# Patient Record
Sex: MALE | Race: WHITE | Employment: OTHER | URBAN - METROPOLITAN AREA
[De-identification: names, ages, dates, MRNs, and addresses within clinical notes are randomized per-mention and may not be internally consistent; named-entity substitution may affect disease eponyms.]

---

## 2024-04-10 ENCOUNTER — HOSPITAL ENCOUNTER (INPATIENT)
Age: 29
LOS: 4 days | Discharge: HOME | DRG: 750 | End: 2024-04-15
Attending: STUDENT IN AN ORGANIZED HEALTH CARE EDUCATION/TRAINING PROGRAM | Admitting: PSYCHIATRY & NEUROLOGY
Payer: COMMERCIAL

## 2024-04-10 DIAGNOSIS — F25.9 SCHIZOAFFECTIVE DISORDER, UNSPECIFIED TYPE (HCC): Primary | ICD-10-CM

## 2024-04-10 LAB
ALBUMIN SERPL-MCNC: 4.4 G/DL (ref 3.5–4.6)
ALP SERPL-CCNC: 55 U/L (ref 35–104)
ALT SERPL-CCNC: 11 U/L (ref 0–41)
AMPHET UR QL SCN: ABNORMAL
ANION GAP SERPL CALCULATED.3IONS-SCNC: 10 MEQ/L (ref 9–15)
APAP SERPL-MCNC: <5 UG/ML (ref 10–30)
AST SERPL-CCNC: 19 U/L (ref 0–40)
BARBITURATES UR QL SCN: ABNORMAL
BASOPHILS # BLD: 0 K/UL (ref 0–0.2)
BASOPHILS NFR BLD: 0.5 %
BENZODIAZ UR QL SCN: ABNORMAL
BILIRUB SERPL-MCNC: 0.6 MG/DL (ref 0.2–0.7)
BILIRUB UR QL STRIP: NEGATIVE
BUN SERPL-MCNC: 10 MG/DL (ref 6–20)
CALCIUM SERPL-MCNC: 9.6 MG/DL (ref 8.5–9.9)
CANNABINOIDS UR QL SCN: POSITIVE
CHLORIDE SERPL-SCNC: 103 MEQ/L (ref 95–107)
CHOLEST SERPL-MCNC: 150 MG/DL (ref 0–199)
CK SERPL-CCNC: 175 U/L (ref 0–190)
CLARITY UR: CLEAR
CO2 SERPL-SCNC: 26 MEQ/L (ref 20–31)
COCAINE UR QL SCN: ABNORMAL
COLOR UR: YELLOW
CREAT SERPL-MCNC: 0.77 MG/DL (ref 0.7–1.2)
DRUG SCREEN COMMENT UR-IMP: ABNORMAL
EOSINOPHIL # BLD: 0.1 K/UL (ref 0–0.7)
EOSINOPHIL NFR BLD: 1.6 %
ERYTHROCYTE [DISTWIDTH] IN BLOOD BY AUTOMATED COUNT: 13 % (ref 11.5–14.5)
ETHANOL PERCENT: NORMAL G/DL
ETHANOLAMINE SERPL-MCNC: <10 MG/DL (ref 0–0.08)
FENTANYL SCREEN, URINE: ABNORMAL
GLOBULIN SER CALC-MCNC: 3 G/DL (ref 2.3–3.5)
GLUCOSE SERPL-MCNC: 115 MG/DL (ref 70–99)
GLUCOSE UR STRIP-MCNC: NEGATIVE MG/DL
HCT VFR BLD AUTO: 40.6 % (ref 42–52)
HDLC SERPL-MCNC: 53 MG/DL (ref 40–59)
HGB BLD-MCNC: 13.8 G/DL (ref 14–18)
HGB UR QL STRIP: NEGATIVE
KETONES UR STRIP-MCNC: NEGATIVE MG/DL
LDLC SERPL CALC-MCNC: 81 MG/DL (ref 0–129)
LEUKOCYTE ESTERASE UR QL STRIP: NEGATIVE
LYMPHOCYTES # BLD: 1.7 K/UL (ref 1–4.8)
LYMPHOCYTES NFR BLD: 29 %
MCH RBC QN AUTO: 31.7 PG (ref 27–31.3)
MCHC RBC AUTO-ENTMCNC: 34 % (ref 33–37)
MCV RBC AUTO: 93.1 FL (ref 79–92.2)
METHADONE UR QL SCN: ABNORMAL
MONOCYTES # BLD: 0.5 K/UL (ref 0.2–0.8)
MONOCYTES NFR BLD: 9 %
NEUTROPHILS # BLD: 3.4 K/UL (ref 1.4–6.5)
NEUTS SEG NFR BLD: 59.7 %
NITRITE UR QL STRIP: NEGATIVE
OPIATES UR QL SCN: ABNORMAL
OXYCODONE UR QL SCN: ABNORMAL
PCP UR QL SCN: ABNORMAL
PH UR STRIP: 7 [PH] (ref 5–9)
PLATELET # BLD AUTO: 357 K/UL (ref 130–400)
POTASSIUM SERPL-SCNC: 3.5 MEQ/L (ref 3.4–4.9)
PROPOXYPH UR QL SCN: ABNORMAL
PROT SERPL-MCNC: 7.4 G/DL (ref 6.3–8)
PROT UR STRIP-MCNC: NEGATIVE MG/DL
RBC # BLD AUTO: 4.36 M/UL (ref 4.7–6.1)
SALICYLATES SERPL-MCNC: <0.3 MG/DL (ref 15–30)
SODIUM SERPL-SCNC: 139 MEQ/L (ref 135–144)
SP GR UR STRIP: 1.02 (ref 1–1.03)
TRIGL SERPL-MCNC: 80 MG/DL (ref 0–150)
TSH SERPL-MCNC: 0.55 UIU/ML (ref 0.44–3.86)
URINE REFLEX TO CULTURE: NORMAL
UROBILINOGEN UR STRIP-ACNC: 0.2 E.U./DL
WBC # BLD AUTO: 5.7 K/UL (ref 4.8–10.8)

## 2024-04-10 PROCEDURE — 82077 ASSAY SPEC XCP UR&BREATH IA: CPT

## 2024-04-10 PROCEDURE — 80179 DRUG ASSAY SALICYLATE: CPT

## 2024-04-10 PROCEDURE — 80053 COMPREHEN METABOLIC PANEL: CPT

## 2024-04-10 PROCEDURE — 80307 DRUG TEST PRSMV CHEM ANLYZR: CPT

## 2024-04-10 PROCEDURE — 81003 URINALYSIS AUTO W/O SCOPE: CPT

## 2024-04-10 PROCEDURE — 83036 HEMOGLOBIN GLYCOSYLATED A1C: CPT

## 2024-04-10 PROCEDURE — 80143 DRUG ASSAY ACETAMINOPHEN: CPT

## 2024-04-10 PROCEDURE — 36415 COLL VENOUS BLD VENIPUNCTURE: CPT

## 2024-04-10 PROCEDURE — 82550 ASSAY OF CK (CPK): CPT

## 2024-04-10 PROCEDURE — 85025 COMPLETE CBC W/AUTO DIFF WBC: CPT

## 2024-04-10 PROCEDURE — 80061 LIPID PANEL: CPT

## 2024-04-10 PROCEDURE — 84443 ASSAY THYROID STIM HORMONE: CPT

## 2024-04-10 PROCEDURE — 99285 EMERGENCY DEPT VISIT HI MDM: CPT

## 2024-04-10 ASSESSMENT — LIFESTYLE VARIABLES
HOW OFTEN DO YOU HAVE A DRINK CONTAINING ALCOHOL: NEVER
HOW MANY STANDARD DRINKS CONTAINING ALCOHOL DO YOU HAVE ON A TYPICAL DAY: PATIENT DOES NOT DRINK

## 2024-04-10 ASSESSMENT — PAIN - FUNCTIONAL ASSESSMENT: PAIN_FUNCTIONAL_ASSESSMENT: NONE - DENIES PAIN

## 2024-04-10 NOTE — ED NOTES
Dr Nazario was here to see the pt for medical assessment for the pt, registration will be able to register pt currently

## 2024-04-10 NOTE — ED NOTES
Pt report was given to ALAN Buck and evening staff regarding he needs assessments and BAC summary completed and on call psychiatrist for the pt's disposition

## 2024-04-10 NOTE — ED PROVIDER NOTES
mEq/L    Anion Gap 10 9 - 15 mEq/L    Glucose 115 (H) 70 - 99 mg/dL    BUN 10 6 - 20 mg/dL    Creatinine 0.77 0.70 - 1.20 mg/dL    Est Glom Filt Rate >90.0 >60    Calcium 9.6 8.5 - 9.9 mg/dL    Total Protein 7.4 6.3 - 8.0 g/dL    Albumin 4.4 3.5 - 4.6 g/dL    Total Bilirubin 0.6 0.2 - 0.7 mg/dL    Alkaline Phosphatase 55 35 - 104 U/L    ALT 11 0 - 41 U/L    AST 19 0 - 40 U/L    Globulin 3.0 2.3 - 3.5 g/dL   Ethanol   Result Value Ref Range    Ethanol Lvl <10 mg/dL    Ethanol percent Not indicated G/dL   Lipid Panel   Result Value Ref Range    Cholesterol, Total 150 0 - 199 mg/dL    Triglycerides 80 0 - 150 mg/dL    HDL 53 40 - 59 mg/dL    LDL Calculated 81 0 - 129 mg/dL   Salicylate   Result Value Ref Range    Salicylate, Serum <0.3 (L) 15.0 - 30.0 mg/dL   TSH   Result Value Ref Range    TSH 0.546 0.440 - 3.860 uIU/mL   Urinalysis with Reflex to Culture    Specimen: Urine   Result Value Ref Range    Color, UA Yellow Straw/Yellow    Clarity, UA Clear Clear    Glucose, Ur Negative Negative mg/dL    Bilirubin Urine Negative Negative    Ketones, Urine Negative Negative mg/dL    Specific Gravity, UA 1.023 1.005 - 1.030    Blood, Urine Negative Negative    pH, UA 7.0 5.0 - 9.0    Protein, UA Negative Negative mg/dL    Urobilinogen, Urine 0.2 <2.0 E.U./dL    Nitrite, Urine Negative Negative    Leukocyte Esterase, Urine Negative Negative    Urine Reflex to Culture Not Indicated    Urine Drug Screen   Result Value Ref Range    Amphetamine Screen, Urine Neg Negative <1000 ng/mL    Barbiturate Screen, Ur Neg Negative < 200 ng/mL    Benzodiazepine Screen, Urine Neg Negative < 200 ng/mL    Cannabinoid Scrn, Ur POSITIVE (A) Negative < 50 ng/mL    Cocaine Metabolite Screen, Urine Neg Negative < 300 ng/mL    Opiate Scrn, Ur Neg Negative < 300 ng/mL    PCP Screen, Urine Neg Negative < 25 ng/mL    Methadone Screen, Urine Neg Negative <300 ng/mL    Propoxyphene Scrn, Ur Neg Negative <300 ng/mL    Oxycodone Urine Neg Negative <100

## 2024-04-10 NOTE — ED NOTES
Pt ate fair for dinner drinking fluids quiet and cooperative needs his assessment completed with him

## 2024-04-10 NOTE — ED NOTES
Pt changed into  clothes, belongings checked and placed into locker 6, urinae sample obtained and sent to lab, lab called for blood work.

## 2024-04-11 LAB
ESTIMATED AVERAGE GLUCOSE: 100 MG/DL
HBA1C MFR BLD: 5.1 % (ref 4–6)

## 2024-04-11 PROCEDURE — 6370000000 HC RX 637 (ALT 250 FOR IP): Performed by: PSYCHIATRY & NEUROLOGY

## 2024-04-11 PROCEDURE — 99223 1ST HOSP IP/OBS HIGH 75: CPT | Performed by: PSYCHIATRY & NEUROLOGY

## 2024-04-11 PROCEDURE — 1240000000 HC EMOTIONAL WELLNESS R&B

## 2024-04-11 RX ORDER — MAGNESIUM HYDROXIDE/ALUMINUM HYDROXICE/SIMETHICONE 120; 1200; 1200 MG/30ML; MG/30ML; MG/30ML
30 SUSPENSION ORAL PRN
Status: DISCONTINUED | OUTPATIENT
Start: 2024-04-11 | End: 2024-04-15 | Stop reason: HOSPADM

## 2024-04-11 RX ORDER — HYDROXYZINE HYDROCHLORIDE 50 MG/ML
50 INJECTION, SOLUTION INTRAMUSCULAR EVERY 6 HOURS PRN
Status: DISCONTINUED | OUTPATIENT
Start: 2024-04-11 | End: 2024-04-15 | Stop reason: HOSPADM

## 2024-04-11 RX ORDER — DIVALPROEX SODIUM 250 MG/1
250 TABLET, DELAYED RELEASE ORAL 3 TIMES DAILY
Status: DISCONTINUED | OUTPATIENT
Start: 2024-04-11 | End: 2024-04-15 | Stop reason: HOSPADM

## 2024-04-11 RX ORDER — HALOPERIDOL 5 MG/1
5 TABLET ORAL EVERY 6 HOURS PRN
Status: DISCONTINUED | OUTPATIENT
Start: 2024-04-11 | End: 2024-04-15 | Stop reason: HOSPADM

## 2024-04-11 RX ORDER — HYDROXYZINE PAMOATE 50 MG/1
50 CAPSULE ORAL EVERY 6 HOURS PRN
Status: DISCONTINUED | OUTPATIENT
Start: 2024-04-11 | End: 2024-04-15 | Stop reason: HOSPADM

## 2024-04-11 RX ORDER — BENZTROPINE MESYLATE 1 MG/ML
2 INJECTION INTRAMUSCULAR; INTRAVENOUS 2 TIMES DAILY PRN
Status: DISCONTINUED | OUTPATIENT
Start: 2024-04-11 | End: 2024-04-15 | Stop reason: HOSPADM

## 2024-04-11 RX ORDER — ACETAMINOPHEN 325 MG/1
650 TABLET ORAL EVERY 4 HOURS PRN
Status: DISCONTINUED | OUTPATIENT
Start: 2024-04-11 | End: 2024-04-15 | Stop reason: HOSPADM

## 2024-04-11 RX ORDER — ARIPIPRAZOLE 5 MG/1
5 TABLET ORAL DAILY
Status: DISCONTINUED | OUTPATIENT
Start: 2024-04-11 | End: 2024-04-14

## 2024-04-11 RX ORDER — HALOPERIDOL 5 MG/ML
5 INJECTION INTRAMUSCULAR EVERY 6 HOURS PRN
Status: DISCONTINUED | OUTPATIENT
Start: 2024-04-11 | End: 2024-04-15 | Stop reason: HOSPADM

## 2024-04-11 RX ORDER — TRAZODONE HYDROCHLORIDE 50 MG/1
50 TABLET ORAL NIGHTLY PRN
Status: DISCONTINUED | OUTPATIENT
Start: 2024-04-11 | End: 2024-04-15 | Stop reason: HOSPADM

## 2024-04-11 RX ADMIN — ARIPIPRAZOLE 5 MG: 5 TABLET ORAL at 10:10

## 2024-04-11 RX ADMIN — DIVALPROEX SODIUM 250 MG: 250 TABLET, DELAYED RELEASE ORAL at 14:58

## 2024-04-11 RX ADMIN — DIVALPROEX SODIUM 250 MG: 250 TABLET, DELAYED RELEASE ORAL at 10:10

## 2024-04-11 RX ADMIN — DIVALPROEX SODIUM 250 MG: 250 TABLET, DELAYED RELEASE ORAL at 20:58

## 2024-04-11 ASSESSMENT — PATIENT HEALTH QUESTIONNAIRE - PHQ9
7. TROUBLE CONCENTRATING ON THINGS, SUCH AS READING THE NEWSPAPER OR WATCHING TELEVISION: NEARLY EVERY DAY
9. THOUGHTS THAT YOU WOULD BE BETTER OFF DEAD, OR OF HURTING YOURSELF: MORE THAN HALF THE DAYS
SUM OF ALL RESPONSES TO PHQ QUESTIONS 1-9: 21
SUM OF ALL RESPONSES TO PHQ QUESTIONS 1-9: 23
1. LITTLE INTEREST OR PLEASURE IN DOING THINGS: NEARLY EVERY DAY
5. POOR APPETITE OR OVEREATING: NEARLY EVERY DAY
6. FEELING BAD ABOUT YOURSELF - OR THAT YOU ARE A FAILURE OR HAVE LET YOURSELF OR YOUR FAMILY DOWN: NEARLY EVERY DAY
3. TROUBLE FALLING OR STAYING ASLEEP: NEARLY EVERY DAY
2. FEELING DOWN, DEPRESSED OR HOPELESS: NEARLY EVERY DAY
10. IF YOU CHECKED OFF ANY PROBLEMS, HOW DIFFICULT HAVE THESE PROBLEMS MADE IT FOR YOU TO DO YOUR WORK, TAKE CARE OF THINGS AT HOME, OR GET ALONG WITH OTHER PEOPLE: VERY DIFFICULT
SUM OF ALL RESPONSES TO PHQ9 QUESTIONS 1 & 2: 6
4. FEELING TIRED OR HAVING LITTLE ENERGY: NOT AT ALL
SUM OF ALL RESPONSES TO PHQ QUESTIONS 1-9: 23
8. MOVING OR SPEAKING SO SLOWLY THAT OTHER PEOPLE COULD HAVE NOTICED. OR THE OPPOSITE, BEING SO FIGETY OR RESTLESS THAT YOU HAVE BEEN MOVING AROUND A LOT MORE THAN USUAL: NEARLY EVERY DAY
SUM OF ALL RESPONSES TO PHQ QUESTIONS 1-9: 23

## 2024-04-11 ASSESSMENT — SLEEP AND FATIGUE QUESTIONNAIRES
AVERAGE NUMBER OF SLEEP HOURS: 3
DO YOU HAVE DIFFICULTY SLEEPING: YES
DO YOU USE A SLEEP AID: NO
SLEEP PATTERN: RESTLESSNESS;DISTURBED/INTERRUPTED SLEEP;INSOMNIA

## 2024-04-11 ASSESSMENT — LIFESTYLE VARIABLES
HOW MANY STANDARD DRINKS CONTAINING ALCOHOL DO YOU HAVE ON A TYPICAL DAY: 10 OR MORE
HOW OFTEN DO YOU HAVE A DRINK CONTAINING ALCOHOL: MONTHLY OR LESS

## 2024-04-11 NOTE — ED NOTES
Patient resting quietly in bed. Respirations are even and unlabored. No distress noted at this time.

## 2024-04-11 NOTE — GROUP NOTE
Date: 4/11/2024    Group Start Time: 1345  Group End Time: 1450  Group Topic: Music Therapy    [facility] [place] [facility] [place]    Emotion Identification Through Classical Music Listening  Patients will learn and discuss a wheel of emotions/differences between emotion words. Patients will then listen to a series of classical instrumental pieces and identify what emotion the composer is trying to represent. Patients will be provided with an explanation of different musical qualities (tempo, pitch, rhythm, dynamics) that may aid in describing why they chose a specific emotion. Patients will discuss the similarities between interpreting classical music, and interpreting nonverbal cues/communication.     Pieces used:  \"Radha de Lune\" by Claude Debussy  \"The Entertainer\" by Jose Raul Batista  \"Symphony No.3 in F Major, Op. 90, III. Poco allegretto\" by Samina Rosa  \"Flight of the Bumblebee\" by Chloe  \"Night on Bald Mountain\" by Dmitry Chavez  \"The Lizton\" by Camille Saint-Saens  \"March Slav\" by Christy Villa  \"The Planets: I. Mars, The Bringer of War\" by John Sellers  \"Symphony No. 94 in G, 'Surprise': II. Andante\" by Tiburcio Agudelo    Focus: Coping Skills, Mindfulness Techniques, Interpersonal Communication Skills, and Emotion Identification    Goals: Improve Mood, Improve Insight/Self-Awareness, Increase Socialization/Community Building, Increase Self-Expression, Improve Attention to Task, Improve Coping Skills/Develop Coping Skills, Improve Emotion Identification/Regulation Skills    Attendance: Did not attend  Modes of Intervention: Music and Coping Skills / Psychoeducation and Receptive Music Listening    Discipline Responsible: Music Therapist  Signature: Ana Diaz, MT-BC, PsychEd Spec

## 2024-04-11 NOTE — ED NOTES
Patient resting quietly with eyes closed. Respirations are even and unlabored. No distress noted at this time.

## 2024-04-11 NOTE — CONSULTS
HISTORY AND PHYSICAL             Date: 4/11/2024        Patient Name: Joaquin Dugan     YOB: 1995      Age:  29 y.o.    Chief Complaint     Chief Complaint   Patient presents with    Psychiatric Evaluation     Selby slipped by [facility] SI/Hi          History Obtained From   patient, electronic medical record    History of Present Illness   Joaquin Dugan is a 29-year-old male currently pink slipped from [facility] for suicidal and homicidal ideation.  Per EMR patient went to the [facility] requesting to be restarted on his Latuda.  Patient voices this helped him in the past he reported not being on any medications in the past 2 years he endorses using marijuana and mushrooms for the past 2 weeks.  Patient reports mild headache 5 out of 10, describes headache as throbbing.  Denies changes in vision or dizziness.  Patient denies chest pain, palpitations, lightheadedness, headache, dizziness, shortness of breath, cough, N/V/D, and changes in appetite.  Patient also denies smoking, illicit drug, and alcohol use.  Denies self-inflicted injuries or wounds.  Patient has a history of schizoaffective disorder, bipolar type.  Hospitalist consulted for evaluation recommendation of acute and chronic disease.    Past Medical History     Past Medical History:   Diagnosis Date    Schizoaffective disorder, bipolar type (HCC)         Past Surgical History   History reviewed. No pertinent surgical history.     Medications Prior to Admission     Prior to Admission medications    Not on File        Allergies   Patient has no known allergies.    Social History     Social History       Tobacco History       Smoking Status  Former Quit Date  3/13/2024 Smoking Tobacco Type  Cigarettes quit in 3/13/2024   Pack Year History     Packs/Day From To Years    0 3/13/2024  0.1      Smokeless Tobacco Use  Never              Alcohol History       Alcohol Use Status  Not Currently              Drug Use       Drug Use Status  Not Currently
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
## 2024-04-11 NOTE — GROUP NOTE
Group Therapy Note    Date: 4/11/2024    Group Start Time: 1200  Group End Time: 1250  Group Topic: Activity    [facility] David Zavala        Group Therapy Note    Attendees: 5         Notes:  Pt did not attend today's self care group.      Modes of Intervention: Activity      Discipline Responsible: Behavorial Health Tech      Signature:  David Kim
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
## 2024-04-11 NOTE — ED NOTES
Easily engaged in 1:1, calm and cooperative with the assessment. Open to treatment   Requesting and provided a book.

## 2024-04-11 NOTE — GROUP NOTE
Date: 4/11/2024    Group Start Time: 0935  Group End Time: 1055  Group Topic: Music Therapy    [facility] [place] [facility]    Iso-Principle Playlist  Patients will learn about the \"iso-principle\", or the idea of matching music to your mood in order to gradually change it. Patients will identify how they feel currently and select songs to gradually alter their mood. Patients will select one song from their personal playlist to add to the group playlist. Patients will listen to the group playlist and reflect on how/if it affected their mood. Patients will discuss the outcomes of the experience and if they could see themselves using it as a coping skill in the future.    Focus: Coping Skills and Emotional Awareness/Regulation    Goals: Improve Mood, Improve Insight/Self-Awareness, Improve Self-Expression, Improve Attention to Task, Improve Coping Skills/Develop Coping Skills, Improve Emotion Awareness/Regulation    Patient's Goal: no goal  Attendance: Did not attend  Modes of MT Intervention: Allie Analysis and Song Discussion, Music and Coping Skills / Psychoeducation, Playlist Building, and Receptive Music Listening  Modes of Intervention: Exploration, Clarifying, Problem-solving, and Confrontation    Discipline Responsible: Music Therapist  Signature: Ana Diaz, MT-BC, PsychEd Spec

## 2024-04-12 PROCEDURE — 1240000000 HC EMOTIONAL WELLNESS R&B

## 2024-04-12 PROCEDURE — 90833 PSYTX W PT W E/M 30 MIN: CPT | Performed by: PSYCHIATRY & NEUROLOGY

## 2024-04-12 PROCEDURE — 99232 SBSQ HOSP IP/OBS MODERATE 35: CPT | Performed by: PSYCHIATRY & NEUROLOGY

## 2024-04-12 PROCEDURE — 6370000000 HC RX 637 (ALT 250 FOR IP): Performed by: PSYCHIATRY & NEUROLOGY

## 2024-04-12 RX ADMIN — ARIPIPRAZOLE 5 MG: 5 TABLET ORAL at 08:29

## 2024-04-12 RX ADMIN — DIVALPROEX SODIUM 250 MG: 250 TABLET, DELAYED RELEASE ORAL at 08:29

## 2024-04-12 RX ADMIN — DIVALPROEX SODIUM 250 MG: 250 TABLET, DELAYED RELEASE ORAL at 20:43

## 2024-04-12 RX ADMIN — DIVALPROEX SODIUM 250 MG: 250 TABLET, DELAYED RELEASE ORAL at 14:30

## 2024-04-12 NOTE — GROUP NOTE
Date: 4/12/2024    Group Start Time: 0940  Group End Time: 1040  Group Topic: Music Therapy    [facility] [place] [facility] Ana Jaime    Active Music Making, Live Music Listening, and Music Reminiscence  Patients will be given a songbook and offered the opportunity to select (a) song(s) of their choice for live music listening on WedWu. Patients will be encouraged to sing along, move along, and listen to the music.     Focus: Self-Expression and Creativity, Processing, and Self-Esteem    Goals: Improve Mood, Decrease Isolation, Increase Sense of Community/Socialization, Improve Self-Esteem, Increase Self-Expression, Provide Sense of Autonomy, Develop Coping Skills    Attendance: Did not attend  Modes of Intervention: Active Music Making, Live Music Listening, and Music Reminiscence    Discipline Responsible: Music Therapist  Signature: LYNN Fuller, PsychEd Spec

## 2024-04-12 NOTE — GROUP NOTE
Date: 4/12/2024    Group Start Time: 1330  Group End Time: 1410  Group Topic: Music Therapy    [facility] [place] [facility] [address] Jaime    Music and Mindfulness  Patients will be introduced to the concept of mindfulness in relation to music listening. Patients will be given a handout to write down thoughts as they listen about song interpretation, vocal quality, instrumentation, and other musical qualities. Patients will be asked to listen to an original version of a song, and then compare it to 1-2 covers of the same song. Patients will discuss the benefits of staying of mindful, challenges of staying mindful, and how mindfulness may apply to their lives.    Focus: Coping Skills, Mindfulness Techniques, and Anxiety Management through music    Goals: Increase Attention to Task, Develop/Maintain Coping Skills, Increase Socialization/Building Community, Improve Mood, Increase/Maintain Expressive Communication, Improve/Maintain Self-Expression, Decrease Impulsive Behaviors, Improve Insight/Self-Awareness    Songs used:   \"Stand by Me\" original by Mamadou Martini; covers by Laurie and the James, and Prince Ferrari    Attendance: Did not attend  Modes of Intervention: Music and Coping Skills / Psychoeducation and Receptive Music Listening    Discipline Responsible: Music Therapist  Signature: Ana Diaz, LYNN, PsychEd Spec

## 2024-04-12 NOTE — GROUP NOTE
Group Therapy Note    Date: 4/12/2024    Group Start Time: 1030  Group End Time: 1050  Group Topic: Activity    [facility] David Zavala        Group Therapy Note    Attendees: 11         Notes:  Pt declined therapy animal service.      Modes of Intervention: Support      Discipline Responsible: Behavorial Health Tech      Signature:  David Kim

## 2024-04-13 PROCEDURE — 1240000000 HC EMOTIONAL WELLNESS R&B

## 2024-04-13 PROCEDURE — 6370000000 HC RX 637 (ALT 250 FOR IP): Performed by: PSYCHIATRY & NEUROLOGY

## 2024-04-13 PROCEDURE — 93005 ELECTROCARDIOGRAM TRACING: CPT | Performed by: PSYCHIATRY & NEUROLOGY

## 2024-04-13 RX ADMIN — DIVALPROEX SODIUM 250 MG: 250 TABLET, DELAYED RELEASE ORAL at 14:31

## 2024-04-13 RX ADMIN — DIVALPROEX SODIUM 250 MG: 250 TABLET, DELAYED RELEASE ORAL at 21:21

## 2024-04-13 RX ADMIN — DIVALPROEX SODIUM 250 MG: 250 TABLET, DELAYED RELEASE ORAL at 09:00

## 2024-04-13 RX ADMIN — ARIPIPRAZOLE 5 MG: 5 TABLET ORAL at 09:01

## 2024-04-14 PROCEDURE — 6370000000 HC RX 637 (ALT 250 FOR IP): Performed by: PSYCHIATRY & NEUROLOGY

## 2024-04-14 PROCEDURE — 1240000000 HC EMOTIONAL WELLNESS R&B

## 2024-04-14 RX ORDER — ARIPIPRAZOLE 10 MG/1
10 TABLET ORAL DAILY
Status: DISCONTINUED | OUTPATIENT
Start: 2024-04-15 | End: 2024-04-15 | Stop reason: HOSPADM

## 2024-04-14 RX ADMIN — DIVALPROEX SODIUM 250 MG: 250 TABLET, DELAYED RELEASE ORAL at 13:50

## 2024-04-14 RX ADMIN — ARIPIPRAZOLE 5 MG: 5 TABLET ORAL at 08:40

## 2024-04-14 RX ADMIN — DIVALPROEX SODIUM 250 MG: 250 TABLET, DELAYED RELEASE ORAL at 08:42

## 2024-04-14 RX ADMIN — DIVALPROEX SODIUM 250 MG: 250 TABLET, DELAYED RELEASE ORAL at 21:30

## 2024-04-14 NOTE — GROUP NOTE
Group Therapy Note    Date: 4/14/2024    Group Start Time: 1000  Group End Time: 1050  Group Topic: Art Therapy     [facility] Emily Walker, ANA        Group Therapy Note    Attendees: 13       Patient's Goal:  To get some more sleep.    Notes:  Patient attended the morning group art therapy session. He selected the chalk pastels for creative expression. Patient used a pastel colored background and then thee images over the background in black. When complimented on his art task, patient was reminded of an art scholarship he once had but never utilized. He verbalized ambivalence over the loss of the scholarship.     Status After Intervention:  Improved    Participation Level: Active Listener    Participation Quality: Appropriate and Attentive      Speech:  normal      Thought Process/Content: Logical      Affective Functioning: Congruent      Mood:  Calm      Level of consciousness:  Alert      Response to Learning: Able to verbalize current knowledge/experience      Endings: None Reported    Modes of Intervention: Activity      Discipline Responsible: Psychoeducational Specialist      Signature:  Emily LITTLE

## 2024-04-14 NOTE — PLAN OF CARE
Problem: Self Harm/Suicidality  Goal: Will have no self-injury during hospital stay  Description: INTERVENTIONS:  1.  Ensure constant observer at bedside with Q15M safety checks  2.  Maintain a safe environment  3.  Secure patient belongings  4.  Ensure family/visitors adhere to safety recommendations  5.  Ensure safety tray has been added to patient's diet order  6.  Every shift and PRN: Re-assess suicidal risk via Frequent Screener    4/12/2024 2334 by Maria R Moyer RN  Outcome: Progressing  4/12/2024 1719 by Shant Platt RN  Outcome: Progressing  Flowsheets (Taken 4/12/2024 1133)  Will have no self-injury during hospital stay: Maintain a safe environment     Problem: Risk for Elopement  Goal: Patient will not exit the unit/facility without proper excort  4/12/2024 2334 by Maria R Moyer RN  Outcome: Progressing  4/12/2024 1719 by Shant Platt RN  Outcome: Progressing  Flowsheets (Taken 4/12/2024 1133)  Nursing Interventions for Elopement Risk:   Assist with personal care needs such as toileting, eating, dressing, as needed to reduce the risk of wandering   Escort with two staff members if patient must leave the unit   Make sure patient has all necessary personal care items   Reduce environmental triggers     Problem: Psychosis  Goal: Will report no hallucinations or delusions  Description: INTERVENTIONS:  1. Administer medication as  ordered  2. Assist with reality testing to support increasing orientation  3. Assess if patient's hallucinations or delusions are encouraging self harm or harm to others and intervene as appropriate  4/12/2024 2334 by Maria R Moyer RN  Outcome: Progressing  4/12/2024 1719 by Shant Platt RN  Outcome: Progressing     Problem: Anxiety  Goal: Will report anxiety at manageable levels  Description: INTERVENTIONS:  1. Administer medication as ordered  2. Teach and rehearse alternative coping skills  3. Provide emotional support with 1:1 interaction with staff  4/12/2024 2334 by João 
  Problem: Self Harm/Suicidality  Goal: Will have no self-injury during hospital stay  Description: INTERVENTIONS:  1.  Ensure constant observer at bedside with Q15M safety checks  2.  Maintain a safe environment  3.  Secure patient belongings  4.  Ensure family/visitors adhere to safety recommendations  5.  Ensure safety tray has been added to patient's diet order  6.  Every shift and PRN: Re-assess suicidal risk via Frequent Screener    4/13/2024 0738 by Shant Platt RN  Outcome: Progressing  4/12/2024 2334 by Maria R Moyer RN  Outcome: Progressing     Problem: Risk for Elopement  Goal: Patient will not exit the unit/facility without proper excort  4/13/2024 0738 by Shant Platt RN  Outcome: Progressing  4/12/2024 2334 by Maria R Moyer RN  Outcome: Progressing     Problem: Psychosis  Goal: Will report no hallucinations or delusions  Description: INTERVENTIONS:  1. Administer medication as  ordered  2. Assist with reality testing to support increasing orientation  3. Assess if patient's hallucinations or delusions are encouraging self harm or harm to others and intervene as appropriate  4/13/2024 0738 by Shant Platt RN  Outcome: Progressing  4/12/2024 2334 by Maria R Moyer RN  Outcome: Progressing     Problem: Anxiety  Goal: Will report anxiety at manageable levels  Description: INTERVENTIONS:  1. Administer medication as ordered  2. Teach and rehearse alternative coping skills  3. Provide emotional support with 1:1 interaction with staff  4/13/2024 0738 by Shant Platt RN  Outcome: Progressing  4/12/2024 2334 by Maria R Moyer RN  Outcome: Progressing     Problem: Sleep Disturbance  Goal: Will exhibit normal sleeping pattern  Description: INTERVENTIONS:  1. Administer medication as ordered  2. Decrease environmental stimuli, including noise, as appropriate  3. Discourage social isolation and naps during the day  4/13/2024 0738 by Shant Platt RN  Outcome: Progressing  4/12/2024 2334 by Maria R Moyer 
  Problem: Self Harm/Suicidality  Goal: Will have no self-injury during hospital stay  Description: INTERVENTIONS:  1.  Ensure constant observer at bedside with Q15M safety checks  2.  Maintain a safe environment  3.  Secure patient belongings  4.  Ensure family/visitors adhere to safety recommendations  5.  Ensure safety tray has been added to patient's diet order  6.  Every shift and PRN: Re-assess suicidal risk via Frequent Screener    Outcome: Progressing  Flowsheets (Taken 4/12/2024 1133)  Will have no self-injury during hospital stay: Maintain a safe environment     Problem: Risk for Elopement  Goal: Patient will not exit the unit/facility without proper excort  Outcome: Progressing  Flowsheets (Taken 4/12/2024 1133)  Nursing Interventions for Elopement Risk:   Assist with personal care needs such as toileting, eating, dressing, as needed to reduce the risk of wandering   Escort with two staff members if patient must leave the unit   Make sure patient has all necessary personal care items   Reduce environmental triggers     Problem: Psychosis  Goal: Will report no hallucinations or delusions  Description: INTERVENTIONS:  1. Administer medication as  ordered  2. Assist with reality testing to support increasing orientation  3. Assess if patient's hallucinations or delusions are encouraging self harm or harm to others and intervene as appropriate  Outcome: Progressing     Problem: Anxiety  Goal: Will report anxiety at manageable levels  Description: INTERVENTIONS:  1. Administer medication as ordered  2. Teach and rehearse alternative coping skills  3. Provide emotional support with 1:1 interaction with staff  Outcome: Progressing  Flowsheets (Taken 4/12/2024 1133)  Will report anxiety at manageable levels: Administer medication as ordered     Problem: Sleep Disturbance  Goal: Will exhibit normal sleeping pattern  Description: INTERVENTIONS:  1. Administer medication as ordered  2. Decrease environmental 
Patient interviewed and assessed in his room, in bed where he isolated to for the entire shift.  Patient rates anxiety 0 and depression 0 on a 10 point scale where 10 is the worst.  Patient denies SI, HI, and hallucinations currently but does admit to having visual and auditory hallucinations earlier in the day. He seems to be minimizing symptoms for this nurse as this nurse had to really inquire hard about circumstances of admission before the patient admitted to any symptoms.  He states he is here for SI and HI along with the a/v hallucinations he was having earlier (would not elaborate on what the hallucinations were).  Patient does not want to hurt anyone personally, he just has thoughts at times that he should.  Patient is very friendly with this nurse and cooperative.  He is not social with peers. Patient would like to get back on his feet with a job and a place to live.  He understands he needs to deal with his mental health issues first.  He is able to make needs known. Call light remains in reach. He was checked on by this nurse several times throughout the day (in addition to q15 minute checks) because he isolated to his room all day.  He was noted to sleep most of the shift.  Patient denies needs at this time.  Will continue to monitor and address needs as they arise.  Call light is within reach.       Problem: Self Harm/Suicidality  Goal: Will have no self-injury during hospital stay  Description: INTERVENTIONS:  1.  Ensure constant observer at bedside with Q15M safety checks  2.  Maintain a safe environment  3.  Secure patient belongings  4.  Ensure family/visitors adhere to safety recommendations  5.  Ensure safety tray has been added to patient's diet order  6.  Every shift and PRN: Re-assess suicidal risk via Frequent Screener    4/11/2024 2232 by Nisa Hirsch, RN  Outcome: Progressing  Flowsheets (Taken 4/11/2024 2229)  Will have no self-injury during hospital stay: Maintain a safe 
assistive devices as needed  3. Obtain PT/OT consults as needed  4. Assist and instruct patient to increase activity and self care as tolerated  4/14/2024 1134 by Ping Jarrett, RN  Outcome: Progressing  4/14/2024 0211 by Cedric Silverman, RN  Outcome: Progressing     Problem: Discharge Planning  Goal: Discharge to home or other facility with appropriate resources  4/14/2024 1134 by Ping Jarrett, RN  Outcome: Progressing  Flowsheets (Taken 4/14/2024 1123)  Discharge to home or other facility with appropriate resources: Identify barriers to discharge with patient and caregiver  4/14/2024 0211 by Cedric Silverman, RN  Outcome: Progressing

## 2024-04-15 VITALS
RESPIRATION RATE: 18 BRPM | HEART RATE: 88 BPM | BODY MASS INDEX: 22.96 KG/M2 | OXYGEN SATURATION: 100 % | TEMPERATURE: 97.7 F | HEIGHT: 69 IN | WEIGHT: 155 LBS | DIASTOLIC BLOOD PRESSURE: 81 MMHG | SYSTOLIC BLOOD PRESSURE: 117 MMHG

## 2024-04-15 LAB
EKG ATRIAL RATE: 63 BPM
EKG P AXIS: 56 DEGREES
EKG P-R INTERVAL: 130 MS
EKG Q-T INTERVAL: 376 MS
EKG QRS DURATION: 84 MS
EKG QTC CALCULATION (BAZETT): 384 MS
EKG R AXIS: 86 DEGREES
EKG T AXIS: 65 DEGREES
EKG VENTRICULAR RATE: 63 BPM
VALPROATE SERPL-MCNC: 59.2 UG/ML (ref 50–100)

## 2024-04-15 PROCEDURE — 6370000000 HC RX 637 (ALT 250 FOR IP): Performed by: PSYCHIATRY & NEUROLOGY

## 2024-04-15 PROCEDURE — 93010 ELECTROCARDIOGRAM REPORT: CPT | Performed by: INTERNAL MEDICINE

## 2024-04-15 PROCEDURE — 36415 COLL VENOUS BLD VENIPUNCTURE: CPT

## 2024-04-15 PROCEDURE — 80164 ASSAY DIPROPYLACETIC ACD TOT: CPT

## 2024-04-15 PROCEDURE — 6370000000 HC RX 637 (ALT 250 FOR IP): Performed by: NURSE PRACTITIONER

## 2024-04-15 PROCEDURE — 99239 HOSP IP/OBS DSCHRG MGMT >30: CPT | Performed by: PSYCHIATRY & NEUROLOGY

## 2024-04-15 RX ORDER — DIVALPROEX SODIUM 250 MG/1
250 TABLET, DELAYED RELEASE ORAL 3 TIMES DAILY
Qty: 45 TABLET | Refills: 3 | Status: SHIPPED | OUTPATIENT
Start: 2024-04-15

## 2024-04-15 RX ORDER — ARIPIPRAZOLE 10 MG/1
10 TABLET ORAL DAILY
Qty: 15 TABLET | Refills: 3 | Status: SHIPPED | OUTPATIENT
Start: 2024-04-16

## 2024-04-15 RX ADMIN — DIVALPROEX SODIUM 250 MG: 250 TABLET, DELAYED RELEASE ORAL at 08:50

## 2024-04-15 RX ADMIN — ARIPIPRAZOLE 10 MG: 10 TABLET ORAL at 08:50

## 2024-04-15 NOTE — GROUP NOTE
Date: 4/15/2024    Group Start Time: 0940  Group End Time: 1050  Group Topic: Music Therapy    ML 3W Ana Jaime    Album of Me  Patients will listen to \"100 Years\" by [employer] and discuss lyrics and song interpretations as a group. Patients will discuss how their use of music may/may not have changed throughout their lives. Patients will create an \"Album of Me\" where they identify songs from childhood, teenage years, adulthood, where they are currently, and their favorite song right now. Patients will be invited to select a song and be encouraged to explain their connection to it. Patients create a group playlist and contribute one song each. Patients will listen to the playlist and reflect on their experiences afterwards.     Focus: Coping Skills, Validation/Support, Self-Esteem, Self-Expression, & Insight Development    Goals: Improve Mood, Improve Insight/Self-Awareness, Increase Socialization/Community Building, Increase Self-Expression, Improve Attention to Task, Improve Coping Skills/Develop Coping Skills    Patient's Goal: no goal  Attendance: Did not attend/Anticipated Discharge today  Modes of Intervention: Allie Analysis and Song Discussion, Music Reminiscence, Playlist Building, and Receptive Music Listening    Discipline Responsible: Music Therapist  Signature: Ana Diaz, MT-BC, PsychEd Spec

## 2024-04-15 NOTE — DISCHARGE SUMMARY
05:15 PM    PHENCYCLIDINESCREENURINE Neg 04/10/2024 05:15 PM    ETOH <10 04/10/2024 06:04 PM     Lab Results   Component Value Date/Time    TSH 0.546 04/10/2024 06:04 PM     No results found for: \"LITHIUM\"  Lab Results   Component Value Date    VALPROATE 59.2 04/15/2024       RISK ASSESSMENT AT DISCHARGE: Low risk for suicide and homicide.     Treatment Plan:  Reviewed current Medications with the patient. Education provided on the complaince with treatment.    Risks, benefits, side effects, drug-to-drug interactions and alternatives to treatment were discussed.    Encourage patient to attend outpatient follow up appointment and therapy.    Patient was advised to call the outpatient provider, visit the nearest ED or call 911 if symptoms are not manageable.     Patient's family member was contacted prior to the discharge.         Medication List        START taking these medications      ARIPiprazole 10 MG tablet  Commonly known as: ABILIFY  Take 1 tablet by mouth daily  Start taking on: April 16, 2024     divalproex 250 MG DR tablet  Commonly known as: DEPAKOTE  Take 1 tablet by mouth in the morning, at noon, and at bedtime               Where to Get Your Medications        These medications were sent to [facility] [place] - [address]  - P [phone] - F [phone]  [facility] [address] [place] [address]      Phone: [phone]   ARIPiprazole 10 MG tablet  divalproex 250 MG DR tablet           Reason for more than one antipsychotic:   [x] N/A  [] 3 failed monotherapy(drugs tried):  [] Cross over to a new antipsychotic  [] Taper to monotherapy from polypharmacy  [] Augmentation of Clozapine therapy due to treatment resistance to single therapy        TIME SPEND - 35 MINUTES TO COMPLETE THE EVALUATION, DISCHARGE SUMMARY, MEDICATION RECONCILIATION AND FOLLOW UP CARE     Signed:  SACHA CALDWELL MD  4/15/2024  9:10 AM
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
## 2024-04-15 NOTE — DISCHARGE INSTRUCTIONS
Due to the Covid-19 Pandemic, [facility] is not currently available. For assistance with quitting smoking please go to https://smokefree.gov. A prescription for an FDA-approved tobacco cessation medication was offered at discharge and the patient refused.    Someone from [facility] will be calling you tomorrow to follow up on your care. If you don't hear from us, give us a call! [phone].  Keep all follow up appointments, take medications as ordered, utilize positive supports, abstain from use of alcohol and drugs. If symptoms return or you feel at risk to yourself or others, please call 911, return the nearest emergency room, or call your local crisis hotline:  [facility]: [phone]  [facility]: [phone]  [facility]: [phone]     You were offered the flu vaccine on 4/15/24 and you declined
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
## 2024-04-15 NOTE — DISCHARGE INSTR - DIET

## 2025-01-24 ENCOUNTER — HOSPITAL ENCOUNTER (INPATIENT)
Age: 30
LOS: 6 days | Discharge: HOME | DRG: 761 | End: 2025-01-30
Attending: PSYCHIATRY & NEUROLOGY | Admitting: PSYCHIATRY & NEUROLOGY
Payer: COMMERCIAL

## 2025-01-24 DIAGNOSIS — F25.1 SCHIZOAFFECTIVE DISORDER, DEPRESSIVE TYPE (HCC): Primary | ICD-10-CM

## 2025-01-24 DIAGNOSIS — F25.9 SCHIZOAFFECTIVE DISORDER, UNSPECIFIED TYPE (HCC): ICD-10-CM

## 2025-01-24 LAB
ALBUMIN SERPL-MCNC: 4.4 G/DL (ref 3.5–4.6)
ALP SERPL-CCNC: 58 U/L (ref 35–104)
ALT SERPL-CCNC: 10 U/L (ref 0–41)
AMPHET UR QL SCN: NORMAL
ANION GAP SERPL CALCULATED.3IONS-SCNC: 12 MEQ/L (ref 9–15)
APAP SERPL-MCNC: <5 UG/ML (ref 10–30)
AST SERPL-CCNC: 14 U/L (ref 0–40)
BACTERIA URNS QL MICRO: NEGATIVE /HPF
BARBITURATES UR QL SCN: NORMAL
BASOPHILS # BLD: 0 K/UL (ref 0–0.2)
BASOPHILS NFR BLD: 0.2 %
BENZODIAZ UR QL SCN: NORMAL
BILIRUB SERPL-MCNC: 0.7 MG/DL (ref 0.2–0.7)
BILIRUB UR QL STRIP: NEGATIVE
BUN SERPL-MCNC: 11 MG/DL (ref 6–20)
CALCIUM SERPL-MCNC: 9.2 MG/DL (ref 8.5–9.9)
CANNABINOIDS UR QL SCN: NORMAL
CHLORIDE SERPL-SCNC: 104 MEQ/L (ref 95–107)
CK SERPL-CCNC: 104 U/L (ref 0–190)
CLARITY UR: CLEAR
CO2 SERPL-SCNC: 25 MEQ/L (ref 20–31)
COCAINE UR QL SCN: NORMAL
COLOR UR: YELLOW
CREAT SERPL-MCNC: 0.72 MG/DL (ref 0.7–1.2)
DRUG SCREEN COMMENT UR-IMP: NORMAL
EOSINOPHIL # BLD: 0.1 K/UL (ref 0–0.7)
EOSINOPHIL NFR BLD: 1.2 %
EPI CELLS #/AREA URNS AUTO: NORMAL /HPF (ref 0–5)
ERYTHROCYTE [DISTWIDTH] IN BLOOD BY AUTOMATED COUNT: 12.2 % (ref 11.5–14.5)
ETHANOL PERCENT: NORMAL G/DL
ETHANOLAMINE SERPL-MCNC: <10 MG/DL (ref 0–0.08)
FENTANYL SCREEN, URINE: NORMAL
GLOBULIN SER CALC-MCNC: 2.7 G/DL (ref 2.3–3.5)
GLUCOSE SERPL-MCNC: 107 MG/DL (ref 70–99)
GLUCOSE UR STRIP-MCNC: NEGATIVE MG/DL
HCT VFR BLD AUTO: 41.2 % (ref 42–52)
HGB BLD-MCNC: 14.1 G/DL (ref 14–18)
HGB UR QL STRIP: NEGATIVE
HYALINE CASTS #/AREA URNS AUTO: NORMAL /HPF (ref 0–5)
KETONES UR STRIP-MCNC: NEGATIVE MG/DL
LEUKOCYTE ESTERASE UR QL STRIP: ABNORMAL
LYMPHOCYTES # BLD: 1.2 K/UL (ref 1–4.8)
LYMPHOCYTES NFR BLD: 24.3 %
MAGNESIUM SERPL-MCNC: 1.9 MG/DL (ref 1.7–2.4)
MCH RBC QN AUTO: 29.9 PG (ref 27–31.3)
MCHC RBC AUTO-ENTMCNC: 34.2 % (ref 33–37)
MCV RBC AUTO: 87.3 FL (ref 79–92.2)
METHADONE UR QL SCN: NORMAL
MONOCYTES # BLD: 0.4 K/UL (ref 0.2–0.8)
MONOCYTES NFR BLD: 8.1 %
NEUTROPHILS # BLD: 3.3 K/UL (ref 1.4–6.5)
NEUTS SEG NFR BLD: 66 %
NITRITE UR QL STRIP: NEGATIVE
OPIATES UR QL SCN: NORMAL
OXYCODONE UR QL SCN: NORMAL
PCP UR QL SCN: NORMAL
PH UR STRIP: 7 [PH] (ref 5–9)
PLATELET # BLD AUTO: 389 K/UL (ref 130–400)
POTASSIUM SERPL-SCNC: 4.4 MEQ/L (ref 3.4–4.9)
PROPOXYPH UR QL SCN: NORMAL
PROT SERPL-MCNC: 7.1 G/DL (ref 6.3–8)
PROT UR STRIP-MCNC: NEGATIVE MG/DL
RBC # BLD AUTO: 4.72 M/UL (ref 4.7–6.1)
RBC #/AREA URNS AUTO: NORMAL /HPF (ref 0–5)
SALICYLATES SERPL-MCNC: <0.3 MG/DL (ref 15–30)
SODIUM SERPL-SCNC: 141 MEQ/L (ref 135–144)
SP GR UR STRIP: 1.01 (ref 1–1.03)
URINE REFLEX TO CULTURE: ABNORMAL
UROBILINOGEN UR STRIP-ACNC: 0.2 E.U./DL
WBC # BLD AUTO: 4.9 K/UL (ref 4.8–10.8)
WBC #/AREA URNS AUTO: NORMAL /HPF (ref 0–5)

## 2025-01-24 PROCEDURE — 81001 URINALYSIS AUTO W/SCOPE: CPT

## 2025-01-24 PROCEDURE — 85025 COMPLETE CBC W/AUTO DIFF WBC: CPT

## 2025-01-24 PROCEDURE — 36415 COLL VENOUS BLD VENIPUNCTURE: CPT

## 2025-01-24 PROCEDURE — 82550 ASSAY OF CK (CPK): CPT

## 2025-01-24 PROCEDURE — 80307 DRUG TEST PRSMV CHEM ANLYZR: CPT

## 2025-01-24 PROCEDURE — 6370000000 HC RX 637 (ALT 250 FOR IP): Performed by: PSYCHIATRY & NEUROLOGY

## 2025-01-24 PROCEDURE — 80053 COMPREHEN METABOLIC PANEL: CPT

## 2025-01-24 PROCEDURE — 99285 EMERGENCY DEPT VISIT HI MDM: CPT

## 2025-01-24 PROCEDURE — 83036 HEMOGLOBIN GLYCOSYLATED A1C: CPT

## 2025-01-24 PROCEDURE — 80143 DRUG ASSAY ACETAMINOPHEN: CPT

## 2025-01-24 PROCEDURE — 82077 ASSAY SPEC XCP UR&BREATH IA: CPT

## 2025-01-24 PROCEDURE — 83735 ASSAY OF MAGNESIUM: CPT

## 2025-01-24 PROCEDURE — 93005 ELECTROCARDIOGRAM TRACING: CPT | Performed by: PHYSICIAN ASSISTANT

## 2025-01-24 PROCEDURE — 1240000000 HC EMOTIONAL WELLNESS R&B

## 2025-01-24 PROCEDURE — 80179 DRUG ASSAY SALICYLATE: CPT

## 2025-01-24 RX ORDER — HYDROXYZINE PAMOATE 50 MG/1
50 CAPSULE ORAL EVERY 6 HOURS PRN
Status: DISCONTINUED | OUTPATIENT
Start: 2025-01-24 | End: 2025-01-30 | Stop reason: HOSPADM

## 2025-01-24 RX ORDER — HALOPERIDOL 5 MG/ML
5 INJECTION INTRAMUSCULAR EVERY 6 HOURS PRN
Status: DISCONTINUED | OUTPATIENT
Start: 2025-01-24 | End: 2025-01-30 | Stop reason: HOSPADM

## 2025-01-24 RX ORDER — BENZTROPINE MESYLATE 1 MG/ML
2 INJECTION, SOLUTION INTRAMUSCULAR; INTRAVENOUS 2 TIMES DAILY PRN
Status: DISCONTINUED | OUTPATIENT
Start: 2025-01-24 | End: 2025-01-30 | Stop reason: HOSPADM

## 2025-01-24 RX ORDER — HYDROXYZINE HYDROCHLORIDE 50 MG/ML
50 INJECTION, SOLUTION INTRAMUSCULAR EVERY 6 HOURS PRN
Status: DISCONTINUED | OUTPATIENT
Start: 2025-01-24 | End: 2025-01-30 | Stop reason: HOSPADM

## 2025-01-24 RX ORDER — ACETAMINOPHEN 325 MG/1
650 TABLET ORAL EVERY 4 HOURS PRN
Status: DISCONTINUED | OUTPATIENT
Start: 2025-01-24 | End: 2025-01-30 | Stop reason: HOSPADM

## 2025-01-24 RX ORDER — TRAZODONE HYDROCHLORIDE 50 MG/1
50 TABLET, FILM COATED ORAL NIGHTLY PRN
Status: DISCONTINUED | OUTPATIENT
Start: 2025-01-24 | End: 2025-01-28

## 2025-01-24 RX ORDER — MAGNESIUM HYDROXIDE/ALUMINUM HYDROXICE/SIMETHICONE 120; 1200; 1200 MG/30ML; MG/30ML; MG/30ML
30 SUSPENSION ORAL PRN
Status: DISCONTINUED | OUTPATIENT
Start: 2025-01-24 | End: 2025-01-30 | Stop reason: HOSPADM

## 2025-01-24 RX ORDER — HALOPERIDOL 5 MG/1
5 TABLET ORAL EVERY 6 HOURS PRN
Status: DISCONTINUED | OUTPATIENT
Start: 2025-01-24 | End: 2025-01-30 | Stop reason: HOSPADM

## 2025-01-24 RX ADMIN — TRAZODONE HYDROCHLORIDE 50 MG: 50 TABLET, FILM COATED ORAL at 23:42

## 2025-01-24 RX ADMIN — HYDROXYZINE PAMOATE 50 MG: 50 CAPSULE ORAL at 23:43

## 2025-01-24 ASSESSMENT — PATIENT HEALTH QUESTIONNAIRE - PHQ9
6. FEELING BAD ABOUT YOURSELF - OR THAT YOU ARE A FAILURE OR HAVE LET YOURSELF OR YOUR FAMILY DOWN: SEVERAL DAYS
2. FEELING DOWN, DEPRESSED OR HOPELESS: NEARLY EVERY DAY
SUM OF ALL RESPONSES TO PHQ QUESTIONS 1-9: 18
9. THOUGHTS THAT YOU WOULD BE BETTER OFF DEAD, OR OF HURTING YOURSELF: MORE THAN HALF THE DAYS
SUM OF ALL RESPONSES TO PHQ QUESTIONS 1-9: 18
1. LITTLE INTEREST OR PLEASURE IN DOING THINGS: NOT AT ALL
8. MOVING OR SPEAKING SO SLOWLY THAT OTHER PEOPLE COULD HAVE NOTICED. OR THE OPPOSITE, BEING SO FIGETY OR RESTLESS THAT YOU HAVE BEEN MOVING AROUND A LOT MORE THAN USUAL: NEARLY EVERY DAY
SUM OF ALL RESPONSES TO PHQ9 QUESTIONS 1 & 2: 3
4. FEELING TIRED OR HAVING LITTLE ENERGY: NOT AT ALL
7. TROUBLE CONCENTRATING ON THINGS, SUCH AS READING THE NEWSPAPER OR WATCHING TELEVISION: NEARLY EVERY DAY
SUM OF ALL RESPONSES TO PHQ QUESTIONS 1-9: 16
5. POOR APPETITE OR OVEREATING: NEARLY EVERY DAY
SUM OF ALL RESPONSES TO PHQ QUESTIONS 1-9: 18
10. IF YOU CHECKED OFF ANY PROBLEMS, HOW DIFFICULT HAVE THESE PROBLEMS MADE IT FOR YOU TO DO YOUR WORK, TAKE CARE OF THINGS AT HOME, OR GET ALONG WITH OTHER PEOPLE: EXTREMELY DIFFICULT
3. TROUBLE FALLING OR STAYING ASLEEP: NEARLY EVERY DAY

## 2025-01-24 ASSESSMENT — PAIN - FUNCTIONAL ASSESSMENT: PAIN_FUNCTIONAL_ASSESSMENT: NONE - DENIES PAIN

## 2025-01-24 ASSESSMENT — LIFESTYLE VARIABLES
HOW MANY STANDARD DRINKS CONTAINING ALCOHOL DO YOU HAVE ON A TYPICAL DAY: PATIENT DOES NOT DRINK
HOW OFTEN DO YOU HAVE A DRINK CONTAINING ALCOHOL: NEVER
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
## 2025-01-24 NOTE — ED PROVIDER NOTES
following components:    Hematocrit 41.2 (*)     All other components within normal limits   URINALYSIS WITH REFLEX TO CULTURE - Abnormal; Notable for the following components:    Leukocyte Esterase, Urine TRACE (*)     All other components within normal limits   CK   ETHANOL   MAGNESIUM   URINE DRUG SCREEN   MICROSCOPIC URINALYSIS   HEMOGLOBIN A1C   LIPID PANEL       All other labs were within normal range or not returned as of this dictation.    EMERGENCY DEPARTMENT COURSE and DIFFERENTIAL DIAGNOSIS/MDM:   Vitals:    Vitals:    01/24/25 1739   BP: (!) 153/85   Pulse: 84   Resp: 17   Temp: 98.7 °F (37.1 °C)   TempSrc: Temporal   SpO2: 99%   Weight: 63.5 kg (140 lb)   Height: 1.753 m (5' 9\")            Medical Decision Making  presents to the emergency department patient presents with reported thoughts of suicide he has not had his medication in 6 to 8 months.  Has been having sleeping issues.  He denies any injuries including cuts or bruises he does note that he does self tattoo.  Denies any fever, chills, nausea, vomiting, urinary bleeding, rectal bleeding, pain with urination, cough, congestion.  Symptoms moderate severity nothing improves symptoms only worsen symptoms.    Differential diagnose include but not limited to medication noncompliance, SI, sleep disturbance.  Will add behavioral health labs including CBC, CMP, TSH, CK, urine drug screen, urinalysis, EtOH, acetaminophen, salicylate.  Reviewed patient's behavioral health labs.  Patient medically stable for behavioral health evaluation.   evaluation Dr. Hendricks recommends admission will admit to 3 W.      Amount and/or Complexity of Data Reviewed  Labs: ordered. Decision-making details documented in ED Course.  ECG/medicine tests: ordered.            REASSESSMENT     ED Course as of 01/25/25 0012 Fri Jan 24, 2025 1854 Magnesium: 1.9 [GR]   1854 Ethanol Lvl: <10 [GR]   1854 Total CK: 104 [GR]   1854 Glucose(!): 107 [GR]   1854 Sodium: 141 [GR]   1854

## 2025-01-24 NOTE — ED TRIAGE NOTES
Pink slipped by last   Pt hasn't been sleeping   Thoughts of suicide (no active plan)  Pt hallucinating (hearing and seeing things that aren't there)

## 2025-01-24 NOTE — ED NOTES
1745: Patient arrived to unit. Changed into psych safe clothing. Belongings locked up in locker 4. Patient had large knife which security was given.

## 2025-01-25 LAB
CHOLEST SERPL-MCNC: 142 MG/DL (ref 0–199)
ESTIMATED AVERAGE GLUCOSE: 103 MG/DL
HBA1C MFR BLD: 5.2 % (ref 4–6)
HDLC SERPL-MCNC: 45 MG/DL (ref 40–59)
LDLC SERPL CALC-MCNC: 77 MG/DL (ref 0–129)
TRIGL SERPL-MCNC: 100 MG/DL (ref 0–150)

## 2025-01-25 PROCEDURE — 6370000000 HC RX 637 (ALT 250 FOR IP): Performed by: NURSE PRACTITIONER

## 2025-01-25 PROCEDURE — 6370000000 HC RX 637 (ALT 250 FOR IP): Performed by: PSYCHIATRY & NEUROLOGY

## 2025-01-25 PROCEDURE — 80061 LIPID PANEL: CPT

## 2025-01-25 PROCEDURE — 1240000000 HC EMOTIONAL WELLNESS R&B

## 2025-01-25 PROCEDURE — 36415 COLL VENOUS BLD VENIPUNCTURE: CPT

## 2025-01-25 RX ORDER — OXCARBAZEPINE 150 MG/1
150 TABLET, FILM COATED ORAL 2 TIMES DAILY
Status: DISCONTINUED | OUTPATIENT
Start: 2025-01-25 | End: 2025-01-28

## 2025-01-25 RX ORDER — ARIPIPRAZOLE 5 MG/1
5 TABLET ORAL DAILY
Status: DISCONTINUED | OUTPATIENT
Start: 2025-01-25 | End: 2025-01-30 | Stop reason: HOSPADM

## 2025-01-25 RX ADMIN — HALOPERIDOL 5 MG: 5 TABLET ORAL at 09:48

## 2025-01-25 RX ADMIN — HYDROXYZINE PAMOATE 50 MG: 50 CAPSULE ORAL at 21:04

## 2025-01-25 RX ADMIN — OXCARBAZEPINE 150 MG: 150 TABLET, FILM COATED ORAL at 21:04

## 2025-01-25 RX ADMIN — TRAZODONE HYDROCHLORIDE 50 MG: 50 TABLET, FILM COATED ORAL at 21:04

## 2025-01-25 RX ADMIN — ARIPIPRAZOLE 5 MG: 5 TABLET ORAL at 11:45

## 2025-01-25 RX ADMIN — OXCARBAZEPINE 150 MG: 150 TABLET, FILM COATED ORAL at 11:45

## 2025-01-25 ASSESSMENT — SLEEP AND FATIGUE QUESTIONNAIRES
SLEEP PATTERN: INSOMNIA
DO YOU USE A SLEEP AID: NO
DO YOU HAVE DIFFICULTY SLEEPING: YES

## 2025-01-25 NOTE — GROUP NOTE
Group Therapy Note  Patient did not attend group.   Date: 1/25/2025    Group Start Time: 1000  Group End Time: 1045  Group Topic: Psychoeducation    [facility] 3[facility] Pham Flores    Group Therapy Note    Healthy Boundaries    Description:  Patients will be directed to complete a worksheet that has prompts about boundaries and how to set them. These prompts include who the boundary needs to be set with, what type of boundary it is, what the fears are associated with setting the boundary, how is not setting the boundary affecting them, how they would feel if they placed the healthy boundary, and a positive affirmation. Topics of what healthy vs unhealthy boundaries consist of, how to set boundaries with people, how to identify boundaries to set, and what the possible outcomes of these boundaries will be mentioned within this intervention. Patients will be assessed on reality orientation, healthy boundary vs unhealthy boundary, positive or negative outlook, and who the boundary is about (insight into issues).     Goals:   Reality orientation, insight into issues, boundary awareness, discussion with peers, positive life outlook      Signature: Pham Simon, Psychoeducational Specialist

## 2025-01-25 NOTE — ED NOTES
Consult with Dr Washington. Patient to be admitted to [facility] with the following orders.
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
## 2025-01-25 NOTE — CONSULTS
Consult      Patient:  Joaquin Dugan  YOB: 1995    MRN: [id number]     Acct: [id number]    Primary Care Physician: No primary care provider on file.    HISTORY OF PRESENT ILLNESS:   History obtained from chart review and the patient.    The patient is a 29 y.o. male whom I have been requested to see by Dr. Hernandez for evaluation of medical treatment. Patient was admitted over night via ER due to suicidal ideation/schizoaffective disorder/depression. Denied dyspnea/CP/dizziness    Past Medical History:        Diagnosis Date    Schizoaffective disorder, bipolar type (HCC)        Past Surgical History:  History reviewed. No pertinent surgical history.    Medications:    No current facility-administered medications on file prior to encounter.     Current Outpatient Medications on File Prior to Encounter   Medication Sig Dispense Refill    divalproex (DEPAKOTE) 250 MG DR tablet Take 1 tablet by mouth in the morning, at noon, and at bedtime (Patient not taking: Reported on 1/24/2025) 45 tablet 3    ARIPiprazole (ABILIFY) 10 MG tablet Take 1 tablet by mouth daily (Patient not taking: Reported on 1/24/2025) 15 tablet 3       Allergies:  Patient has no known allergies.    Social History:    reports that he quit smoking about 10 months ago. His smoking use included cigarettes. He has never used smokeless tobacco. He reports that he does not currently use alcohol. He reports that he does not currently use drugs after having used the following drugs: Marijuana (Weed).    Occupation:     Family History:   History reviewed. No pertinent family history.    Review of systems:  Constitutional: no fever, no night sweats, no fatigue  Head: no headache, no head injury, no migranes.  Eye: no blurring of vision, no double vision.  Ears: no hearing difficulty, no tinnitus  Mouth/throat: no ulceration, dental caries, dysphagia  Lungs: no cough, no shortness of breath, no wheeze  CVS: no palpitation, no chest pain, no

## 2025-01-25 NOTE — PLAN OF CARE
Problem: Risk for Elopement  Goal: Patient will not exit the unit/facility without proper excort  Outcome: Progressing  Flowsheets  Taken 1/25/2025 0801 by Sierra Cervantes, RN  Nursing Interventions for Elopement Risk: Communicate to physician the risk for elopement  Taken 1/25/2025 0019 by Barbara Prabhakar RN  Nursing Interventions for Elopement Risk: Communicate/escalate to charge nurse the risk of elopement     Problem: Anxiety  Goal: Will report anxiety at manageable levels  Description: INTERVENTIONS:  1. Administer medication as ordered  2. Teach and rehearse alternative coping skills  3. Provide emotional support with 1:1 interaction with staff  Outcome: Progressing  Flowsheets (Taken 1/25/2025 0801)  Will report anxiety at manageable levels:   Administer medication as ordered   Teach and rehearse alternative coping skills   Provide emotional support with 1:1 interaction with staff     Problem: Coping  Goal: Pt/Family able to verbalize concerns and demonstrate effective coping strategies  Description: INTERVENTIONS:  1. Assist patient/family to identify coping skills, available support systems and cultural and spiritual values  2. Provide emotional support, including active listening and acknowledgement of concerns of patient and caregivers  3. Reduce environmental stimuli, as able  4. Instruct patient/family in relaxation techniques, as appropriate  5. Assess for spiritual pain/suffering and initiate Spiritual Care, Psychosocial Clinical Specialist consults as needed  Outcome: Progressing  Flowsheets (Taken 1/25/2025 0801)  Patient/family able to verbalize anxieties, fears, and concerns, and demonstrate effective coping:   Reduce environmental stimuli, as able   Provide emotional support, including active listening and acknowledgement of concerns of patient and caregivers   Instruct patient/family in relaxation techniques, as appropriate     Problem: Confusion  Goal: Confusion, delirium, dementia, or

## 2025-01-25 NOTE — CARE COORDINATION
Leisure Assessment  January 25, 2025 /  0915 am    Appearance: Alert, Appears as stated age, In mild distress, and Withdrawn  Current Mental Status: Agitated  Affect/Mood: Flat/ Reserved  Thought Content/Processes: Vague  Insight/Judgement: Poor insight and Poor judgment  Speech: normal rate and normal volume  Delusions/Hallucinations: no evidence of delusions /  none reported :   Admit Status: Voluntary    Pt reported having no hobbies at this time. Pt reported being adaptable as a strength, and reported that he hasn't found any limitations yet. Pt reported that his support system is weak, and he feels that he \"can't be real with them.\" Pt stated that nothing was wrong, and that he shouldn't be here; pt was voluntary admit. Pt reported spending his time \"staring at the four walls I'm in\", and reported feeling content with that. Pt reported feeling unsafe on 3W d/t \"how easy it was to get up here without having any real issues.\" Pt stated he does not want to reveal information about himself here. Pt reported having a goal of sleeping, and that his sleep has been poor for 2 months.     Recommendations: Increase Socialization, Improve/Maintain Coping Skills Development, Improve/Maintain Expressive Communication/Self-Expression, and Improve/Maintain Insight/Self-Awareness    Signature: Pham Simon, Psychoeducational Specialist

## 2025-01-26 PROCEDURE — 1240000000 HC EMOTIONAL WELLNESS R&B

## 2025-01-26 PROCEDURE — 6370000000 HC RX 637 (ALT 250 FOR IP): Performed by: PSYCHIATRY & NEUROLOGY

## 2025-01-26 PROCEDURE — 6370000000 HC RX 637 (ALT 250 FOR IP): Performed by: NURSE PRACTITIONER

## 2025-01-26 RX ADMIN — TRAZODONE HYDROCHLORIDE 50 MG: 50 TABLET, FILM COATED ORAL at 21:06

## 2025-01-26 RX ADMIN — ARIPIPRAZOLE 5 MG: 5 TABLET ORAL at 09:23

## 2025-01-26 RX ADMIN — OXCARBAZEPINE 150 MG: 150 TABLET, FILM COATED ORAL at 09:23

## 2025-01-26 RX ADMIN — OXCARBAZEPINE 150 MG: 150 TABLET, FILM COATED ORAL at 21:06

## 2025-01-26 NOTE — GROUP NOTE
Group Therapy Note  Patient did not attend group.   Date: 1/26/2025    Group Start Time: 0915  Group End Time: 1000  Group Topic: Psychoeducation    [facility] 3[facility] Pham Flores    Group Therapy Note    End of week check-in     Description:   Patients will be directed to complete the worksheet that prompts them to decide how they are currently feeling, what their lowest reported feeling was for the week, and what their highest reported feeling of the week was. Patients will then be prompted on the sheet to describe what that would look like if it were a color, weather pattern, and an animal. Patients will then be encouraged to speak about their experiences and how they described their moods.     Goals:   Peer interaction, emotion/mood identification, mood regulation, reality orientation       Feelings Flower    Description:  Patients are directed to color each \"petal\" of a flower presented corresponding to a color of how they feel about their environment. Environments being reported on are yourself, family/support system, today, friends, your future, and tomorrow. The color options are great/yellow, good/green, okay/blue, fine/orange, bad/red, and unsafe/black. Patients will then be encouraged to converse about their flower with the attending facilitator, and discuss what changes can be made if necessary/possible.     Goals:   Reality orientation, coping skills, social skills, self-awareness      Signature: Pham Simon, Psychoeducational Specialist

## 2025-01-26 NOTE — PLAN OF CARE
Patient denies current SI/HI/AVH. He reports no anxiety or depression. He states he hadn't slept in two weeks prior to this admission and that he slept well last night, he is unsure if its the trazodone that helped. He reports good appetite. He is reluctant to go to groups because \"its an energy thing\" and he gets anxious around others. He tells this nurse that haldol is helpful for the auditory hallucinations. He is cooperative, flat during assessment. No issues reported to this RN at this time.       Problem: Risk for Elopement  Goal: Patient will not exit the unit/facility without proper excort  Outcome: Progressing     Problem: Anxiety  Goal: Will report anxiety at manageable levels  Description: INTERVENTIONS:  1. Administer medication as ordered  2. Teach and rehearse alternative coping skills  3. Provide emotional support with 1:1 interaction with staff  Outcome: Progressing     Problem: Coping  Goal: Pt/Family able to verbalize concerns and demonstrate effective coping strategies  Description: INTERVENTIONS:  1. Assist patient/family to identify coping skills, available support systems and cultural and spiritual values  2. Provide emotional support, including active listening and acknowledgement of concerns of patient and caregivers  3. Reduce environmental stimuli, as able  4. Instruct patient/family in relaxation techniques, as appropriate  5. Assess for spiritual pain/suffering and initiate Spiritual Care, Psychosocial Clinical Specialist consults as needed  Outcome: Progressing     Problem: Confusion  Goal: Confusion, delirium, dementia, or psychosis is improved or at baseline  Description: INTERVENTIONS:  1. Assess for possible contributors to thought disturbance, including medications, impaired vision or hearing, underlying metabolic abnormalities, dehydration, psychiatric diagnoses, and notify attending LIP  2. [place] high risk fall precautions, as indicated  3. Provide frequent short contacts to

## 2025-01-26 NOTE — PLAN OF CARE
Problem: Risk for Elopement  Goal: Patient will not exit the unit/facility without proper excort  1/26/2025 1243 by Sierra Cervantes RN  Outcome: Progressing  Flowsheets (Taken 1/26/2025 1241)  Nursing Interventions for Elopement Risk: Communicate to physician the risk for elopement  1/26/2025 0145 by Annalee Riddle RN  Outcome: Progressing     Problem: Anxiety  Goal: Will report anxiety at manageable levels  Description: INTERVENTIONS:  1. Administer medication as ordered  2. Teach and rehearse alternative coping skills  3. Provide emotional support with 1:1 interaction with staff  1/26/2025 1243 by Sierra Cervantes RN  Outcome: Progressing  Flowsheets (Taken 1/26/2025 1241)  Will report anxiety at manageable levels:   Administer medication as ordered   Provide emotional support with 1:1 interaction with staff   Teach and rehearse alternative coping skills  1/26/2025 0145 by Annalee Riddle RN  Outcome: Progressing     Problem: Coping  Goal: Pt/Family able to verbalize concerns and demonstrate effective coping strategies  Description: INTERVENTIONS:  1. Assist patient/family to identify coping skills, available support systems and cultural and spiritual values  2. Provide emotional support, including active listening and acknowledgement of concerns of patient and caregivers  3. Reduce environmental stimuli, as able  4. Instruct patient/family in relaxation techniques, as appropriate  5. Assess for spiritual pain/suffering and initiate Spiritual Care, Psychosocial Clinical Specialist consults as needed  1/26/2025 1243 by Sierra Cervantes RN  Outcome: Progressing  Flowsheets (Taken 1/26/2025 1241)  Patient/family able to verbalize anxieties, fears, and concerns, and demonstrate effective coping:   Provide emotional support, including active listening and acknowledgement of concerns of patient and caregivers   Instruct patient/family in relaxation techniques, as appropriate   Reduce environmental

## 2025-01-26 NOTE — GROUP NOTE
Group Therapy Note    Date: 1/26/2025    Group Start Time: 1215  Group End Time: 1245  Group Topic: Psychoeducation    [facility] Pham Flores    Group Therapy Note    My Safe Space     Description:   Patients will be directed to draw their perceived safe space on a worksheet, and will then be prompted to explain what they see, heat, smell, taste, and feel while being in that space. Patients will be encouraged to speak about their space with other peers/staff.     Goals:   Peer interaction, insight awareness, emotion/mood regulation        Pt was pleasant and sociable during group. Pt made good eye contact when speaking to peers/staff. Pt reported having a safe space in [place], and elaborated about the experience. Pt responded with appropriate answers for the five senses AEB his answers corresponding to the sense (I.e. hear, smell, taste, etc.). Pt remained in group for the entirety of group.     Attended: Full attendance  Participation Level: Active Listener and Interactive  Participation Quality: Appropriate and Sharing  Affect/Mood: Congruent/Euthymic  Speech: normal rate and normal volume   Insight/Judgement: Fair insight and Fair judgment  Response: Able to verbalize current knowledge/experience, Able to retain information, and Capable of insight    Signature: Pham Simon, Psychoeducational Specialist

## 2025-01-27 LAB
EKG ATRIAL RATE: 79 BPM
EKG P AXIS: 62 DEGREES
EKG P-R INTERVAL: 126 MS
EKG Q-T INTERVAL: 352 MS
EKG QRS DURATION: 84 MS
EKG QTC CALCULATION (BAZETT): 403 MS
EKG R AXIS: 81 DEGREES
EKG T AXIS: 61 DEGREES
EKG VENTRICULAR RATE: 79 BPM

## 2025-01-27 PROCEDURE — 6370000000 HC RX 637 (ALT 250 FOR IP): Performed by: NURSE PRACTITIONER

## 2025-01-27 PROCEDURE — 99232 SBSQ HOSP IP/OBS MODERATE 35: CPT | Performed by: PSYCHIATRY & NEUROLOGY

## 2025-01-27 PROCEDURE — 93010 ELECTROCARDIOGRAM REPORT: CPT | Performed by: INTERNAL MEDICINE

## 2025-01-27 PROCEDURE — 1240000000 HC EMOTIONAL WELLNESS R&B

## 2025-01-27 RX ADMIN — ARIPIPRAZOLE 5 MG: 5 TABLET ORAL at 10:05

## 2025-01-27 RX ADMIN — OXCARBAZEPINE 150 MG: 150 TABLET, FILM COATED ORAL at 10:05

## 2025-01-27 RX ADMIN — OXCARBAZEPINE 150 MG: 150 TABLET, FILM COATED ORAL at 21:37

## 2025-01-27 NOTE — PLAN OF CARE
Problem: Risk for Elopement  Goal: Patient will not exit the unit/facility without proper excort  1/27/2025 1000 by Angelica Concepcion RN  Outcome: Progressing  Flowsheets (Taken 1/27/2025 0958)  Nursing Interventions for Elopement Risk:   Make sure patient has all necessary personal care items   Escort with two staff members if patient must leave the unit   Reduce environmental triggers  1/27/2025 0458 by Barbara Prabhakar RN  Outcome: Progressing     Problem: Anxiety  Goal: Will report anxiety at manageable levels  Description: INTERVENTIONS:  1. Administer medication as ordered  2. Teach and rehearse alternative coping skills  3. Provide emotional support with 1:1 interaction with staff  1/27/2025 1000 by Angelica Concepcion RN  Outcome: Progressing  Flowsheets (Taken 1/27/2025 0958)  Will report anxiety at manageable levels:   Administer medication as ordered   Teach and rehearse alternative coping skills   Provide emotional support with 1:1 interaction with staff  1/27/2025 0458 by Barbara Prabhakar RN  Outcome: Progressing     Problem: Coping  Goal: Pt/Family able to verbalize concerns and demonstrate effective coping strategies  Description: INTERVENTIONS:  1. Assist patient/family to identify coping skills, available support systems and cultural and spiritual values  2. Provide emotional support, including active listening and acknowledgement of concerns of patient and caregivers  3. Reduce environmental stimuli, as able  4. Instruct patient/family in relaxation techniques, as appropriate  5. Assess for spiritual pain/suffering and initiate Spiritual Care, Psychosocial Clinical Specialist consults as needed  1/27/2025 1000 by Angelica Concepcion RN  Outcome: Progressing  Flowsheets (Taken 1/27/2025 0958)  Patient/family able to verbalize anxieties, fears, and concerns, and demonstrate effective coping:   Reduce environmental stimuli, as able   Provide emotional support, including active listening and acknowledgement of

## 2025-01-27 NOTE — GROUP NOTE
Patient did not attend group.    Date: 1/27/2025    Group Start Time: 1310  Group End Time: 1400  Group Topic: Psychoeducation    [facility] [place] 3W Ana Jaime  Clarifying, Education, Exploration, Support    Patients will independently answer the questions \"What does forgiveness mean to you? What might forgiveness look like?\" Patients will have the option to share their responses with the group. Patients will learn about what forgiveness means/does not mean from a psycho-educational standpoint, and compare their personal responses. Patients will reflect on the experience as a group.     Focus: Emotion Regulation, Cognitive Skills  Goals: Improve/Maintain Attention to Task, Improve/Maintain Insight / Self-Awareness, Improve/Maintain Self-Esteem, Improve/Maintain Self-Expression, Improve/Maintain Use of Coping Skills, and Promote Reality Orientation     Signature: Ana Diaz, Licensed Professional Music Therapist (LPMT)

## 2025-01-27 NOTE — PLAN OF CARE
Problem: Risk for Elopement  Goal: Patient will not exit the unit/facility without proper excort  Outcome: Progressing     Problem: Anxiety  Goal: Will report anxiety at manageable levels  Description: INTERVENTIONS:  1. Administer medication as ordered  2. Teach and rehearse alternative coping skills  3. Provide emotional support with 1:1 interaction with staff  Outcome: Progressing     Problem: Coping  Goal: Pt/Family able to verbalize concerns and demonstrate effective coping strategies  Description: INTERVENTIONS:  1. Assist patient/family to identify coping skills, available support systems and cultural and spiritual values  2. Provide emotional support, including active listening and acknowledgement of concerns of patient and caregivers  3. Reduce environmental stimuli, as able  4. Instruct patient/family in relaxation techniques, as appropriate  5. Assess for spiritual pain/suffering and initiate Spiritual Care, Psychosocial Clinical Specialist consults as needed  Outcome: Progressing     Problem: Confusion  Goal: Confusion, delirium, dementia, or psychosis is improved or at baseline  Description: INTERVENTIONS:  1. Assess for possible contributors to thought disturbance, including medications, impaired vision or hearing, underlying metabolic abnormalities, dehydration, psychiatric diagnoses, and notify attending LIP  2. [place] high risk fall precautions, as indicated  3. Provide frequent short contacts to provide reality reorientation, refocusing and direction  4. Decrease environmental stimuli, including noise as appropriate  5. Monitor and intervene to maintain adequate nutrition, hydration, elimination, sleep and activity  6. If unable to ensure safety without constant attention obtain sitter and review sitter guidelines with assigned personnel  7. Initiate Psychosocial CNS and Spiritual Care consult, as indicated  Outcome: Progressing     Problem: Depression/Self Harm  Goal: Effect of psychiatric

## 2025-01-27 NOTE — GROUP NOTE
Group Therapy Note    Date: 1/27/2025    Group Start Time: 1010  Group End Time: 1055  Group Topic: Art Therapy     [facility] 3W Emily Walker, PAULINAW        Group Therapy Note    Attendees: 5       Patient's Goal:  To participate in morning group art therapy.     Notes:  Patient did not attend.     Modes of Intervention: Activity      Discipline Responsible: Psychoeducational Specialist      Signature:  Emily Kramer MA ATR LPAT

## 2025-01-27 NOTE — GROUP NOTE
Patient did not attend group.    Date: 1/27/2025    Group Start Time: 0920  Group End Time: 0950  Group Topic: Music Therapy    [facility] [place] 3W Ana Jaime    Self-Exploration  Allie Analysis/Discussion, Receptive Music Listening    Patients will listen to \"What's Wrong With Me\" by Ashley Irvin and discuss themes/interpretations/lyrics derived from the song. Patients will be encouraged to share their reflections with the group and explore how the song may relate to their lives.     Focus: Self-Exploration, Support, Validation    Goals: Improve/Maintain Attention to Task, Improve/Maintain Identity Development, Improve/Maintain Insight / Self-Awareness, Increase/Maintain Level of Socialization, Improve Mood, Improve/Maintain Self-Esteem, Improve/Maintain Self-Expression, and Improve/Maintain Use of Coping Skills     Signature: Ana Diaz, Licensed Professional Music Therapist (LPMT)

## 2025-01-27 NOTE — CARE COORDINATION
[facility]  3 day Interdisciplinary Treatment Plan Note     Review Date & Time: 1/27/25 0800    Admission Type:   Admission Type: Involuntary    Reason for admission:  Reason for Admission: ''I went to the [facility] and told them I was having thoughts to harm self and other people. I need my meds back.''    Patient Diagnosis: Schizoaffective disorder      PATIENT STRENGTHS:  Patient Strengths:children, some limited social supports, willingness for treatment, previous positive response to treatment     Patient Strengths and Limitations: recent losses, complicated grief, discord with mother due to recent cohabitation, AVH, previous h/o non-suicidal self injurious behaviors, unemployed, non-compliance and prior hospitalization   Addictive Behavior:Addictive Behavior  In the Past 3 Months, Have You Felt or Has Someone Told You That You Have a Problem With  : None  Medical Problems:   Past Medical History:   Diagnosis Date    Schizoaffective disorder, bipolar type (HCC)        Risk:  Fall Risk   Matthew Scale Matthew Scale Score: 22  BVC    Change in scores None. Changes to plan of Care None    Status EXAM:   Mental Status and Behavioral Exam  Normal: No  Level of Assistance: Independent/Self  Facial Expression: Flat  Affect: Congruent  Level of Consciousness: Somnolent  Frequency of Checks: 4 times per hour, close  Mood:Normal: No  Mood: Anxious, Depressed  Motor Activity:Normal: No  Motor Activity: Decreased  Eye Contact: Good  Observed Behavior: Withdrawn  Sexual Misconduct History: Current - no  Preception: [place] to person, [place] to time, [place] to place, [place] to situation  Attention:Normal: Yes  Attention: Distractible  Thought Processes: Unremarkable  Thought Content:Normal: Yes  Thought Content: Preoccupations, Phobias, Paranoia  Depression Symptoms: Isolative, Loss of interest  Anxiety Symptoms: Generalized  Lela Symptoms: No problems reported or observed.  Hallucinations: None  Delusions:

## 2025-01-27 NOTE — GROUP NOTE
Group Therapy Note    Date: 1/27/2025    Group Start Time: 1200  Group End Time: 1240  Group Topic: Healthy Living/Wellness    [facility]    Nu Duran; Pina Stockton RN; Nu Duran RN        Group Therapy Note    Attendees: 5       Patient's Goal:  positive affirmations    Notes:      Status After Intervention:  Unchanged    Participation Level: Active Listener    Participation Quality: Appropriate      Speech:  normal      Thought Process/Content: Logical      Affective Functioning: Congruent      Mood: euthymic      Level of consciousness:  Alert      Response to Learning: Progressing to goal      Endings: None Reported    Modes of Intervention: Education, Support, Socialization, and Exploration      Discipline Responsible: Registered Nurse      Signature:  uN Duran RN

## 2025-01-27 NOTE — CARE COORDINATION
FAMILY COLLATERAL NOTE    Family/Support Name: Renata-   Contact #:[phone]   Relationship to Pt::Father of her children      Family/Support contact aware of hospitalization: Yes    Presenting Symptoms/Current Concerns:  -NO symptoms or concerns  -Patient reached out to above to be dropped off at the [facility]  -Nothing concerning    Top 3 Life Stressors:   -Financial, struggling with employment, and car.    Background History Relevant to Current Hospitalization:  Hx of inpatient admissions  Hx of Schizophrenia    Family Mental Health/Substance Use History:   Biological father Schizophrenia    Discharge Plan:   Return home with mother and step-father    Support Network Supportive of Discharge Plan:   Yes    Support can confirm Safety of Location and Security of Weapons:   No weapons that she is aware of    Support agreeable to Safeguard and Monitor Medications (including Prescription and OTC):   Above does not live with patient and unable to monitor.    Identified Barriers to Compliance with Discharge Plan:   Nothing    Recommendations for Support Network:   Available for follow up calls.      ABAD Arreaga

## 2025-01-28 PROCEDURE — 6370000000 HC RX 637 (ALT 250 FOR IP): Performed by: PSYCHIATRY & NEUROLOGY

## 2025-01-28 PROCEDURE — 1240000000 HC EMOTIONAL WELLNESS R&B

## 2025-01-28 PROCEDURE — 6370000000 HC RX 637 (ALT 250 FOR IP): Performed by: NURSE PRACTITIONER

## 2025-01-28 PROCEDURE — 99232 SBSQ HOSP IP/OBS MODERATE 35: CPT | Performed by: PSYCHIATRY & NEUROLOGY

## 2025-01-28 RX ORDER — CHLORHEXIDINE GLUCONATE ORAL RINSE 1.2 MG/ML
15 SOLUTION DENTAL 2 TIMES DAILY
Status: DISCONTINUED | OUTPATIENT
Start: 2025-01-28 | End: 2025-01-28

## 2025-01-28 RX ORDER — OXCARBAZEPINE 300 MG/1
300 TABLET, FILM COATED ORAL 2 TIMES DAILY
Status: DISCONTINUED | OUTPATIENT
Start: 2025-01-28 | End: 2025-01-30 | Stop reason: HOSPADM

## 2025-01-28 RX ORDER — ZOLPIDEM TARTRATE 5 MG/1
5 TABLET ORAL NIGHTLY
Status: DISCONTINUED | OUTPATIENT
Start: 2025-01-28 | End: 2025-01-30 | Stop reason: HOSPADM

## 2025-01-28 RX ORDER — IBUPROFEN 600 MG/1
600 TABLET, FILM COATED ORAL EVERY 6 HOURS PRN
Status: DISCONTINUED | OUTPATIENT
Start: 2025-01-28 | End: 2025-01-28

## 2025-01-28 RX ADMIN — ZOLPIDEM TARTRATE 5 MG: 5 TABLET, FILM COATED ORAL at 21:45

## 2025-01-28 RX ADMIN — HYDROXYZINE PAMOATE 50 MG: 50 CAPSULE ORAL at 21:45

## 2025-01-28 RX ADMIN — OXCARBAZEPINE 300 MG: 300 TABLET, FILM COATED ORAL at 21:44

## 2025-01-28 RX ADMIN — ARIPIPRAZOLE 5 MG: 5 TABLET ORAL at 08:38

## 2025-01-28 RX ADMIN — OXCARBAZEPINE 150 MG: 150 TABLET, FILM COATED ORAL at 08:38

## 2025-01-28 NOTE — GROUP NOTE
Patient did not attend group.    Date: 1/28/2025    Group Start Time: 0905  Group End Time: 0955  Group Topic: Community Meeting    [facility] [place] 3W Ana Jaime    An Album of Me  Music Reminiscence, Playlist Building, Receptive Music Listening    Patients will identify a variety of songs falling under various prompts (song from childhood, song for where I am now, song for the future, song that represents how you feel today, song that represents how you want to feel today, and a song you never get sick of hearing). Patients will be encouraged to explain their connection to the song(s) and select one to listen to today. Patients will create an \"album title\" to describe their individual playlists.    Focus: Emotion Regulation, Identity Development    Goals: Improve/Maintain Attention to Task, Improve/Maintain Insight / Self-Awareness, Increase/Maintain Level of Socialization, Improve Mood, Improve/Maintain Self-Esteem, Improve/Maintain Self-Expression, Improve/Maintain Use of Coping Skills, and Promote Reality Orientation     Signature: Ana Diaz, Licensed Professional Music Therapist (LPMT)

## 2025-01-28 NOTE — PLAN OF CARE
Joaquin is ou on unit for short periods only he stes, ' When I am not in my room or even when I am home if I go out I always feel like I a gotta look over my shoulder, \" rogelio you never know what someone is going to do\". And, \" I can't be around other peoples negative vibes, to gets to me\".  Patient rates on a zero-tens scale his andty at a 2-3 when in the room but a 7-8 outside the room. He denies depression and places this number at a zero. He reports his appetite his good. He denies SI and HI. He states hallucinations have decreased in amount, volume and intensity, he states that when they do occur, \"they say why are you trying to get rid of me\".        Problem: Risk for Elopement  Goal: Patient will not exit the unit/facility without proper excort  Outcome: Progressing     Problem: Anxiety  Goal: Will report anxiety at manageable levels  Description: INTERVENTIONS:  1. Administer medication as ordered  2. Teach and rehearse alternative coping skills  3. Provide emotional support with 1:1 interaction with staff  Outcome: Progressing     Problem: Coping  Goal: Pt/Family able to verbalize concerns and demonstrate effective coping strategies  Description: INTERVENTIONS:  1. Assist patient/family to identify coping skills, available support systems and cultural and spiritual values  2. Provide emotional support, including active listening and acknowledgement of concerns of patient and caregivers  3. Reduce environmental stimuli, as able  4. Instruct patient/family in relaxation techniques, as appropriate  5. Assess for spiritual pain/suffering and initiate Spiritual Care, Psychosocial Clinical Specialist consults as needed  Outcome: Progressing     Problem: Confusion  Goal: Confusion, delirium, dementia, or psychosis is improved or at baseline  Description: INTERVENTIONS:  1. Assess for possible contributors to thought disturbance, including medications, impaired vision or hearing, underlying metabolic abnormalities,

## 2025-01-28 NOTE — GROUP NOTE
Patient did not attend group.    Date: 1/28/2025    Group Start Time: 1015  Group End Time: 1100  Group Topic: Music Therapy    [facility] [place] 3W Ana Jaime    Live Music Group  Active Music Making, Live Music Listening, and Music Reminiscence    Patients will be given a songbook and offered the opportunity to select (a) song(s) of their choice for live music listening on [employer]. Patients will be encouraged to sing along, move along, and listen to the music.    Focus: Building Positive Experiences and Relaxation     Goals: Increase/Maintain Level of Socialization, Improve Mood, Improve/Maintain Self-Esteem, Improve/Maintain Self-Expression, Improve/Maintain Use of Coping Skills, and Promote Reality Orientation     Signature: Ana Diaz, Licensed Professional Music Therapist (LPMT)

## 2025-01-28 NOTE — PLAN OF CARE
Problem: Risk for Elopement  Goal: Patient will not exit the unit/facility without proper excort  1/27/2025 2001 by Barbara Prabhakar RN  Outcome: Progressing  1/27/2025 1000 by Angelica Concepcion RN  Outcome: Progressing  Flowsheets (Taken 1/27/2025 0958)  Nursing Interventions for Elopement Risk:   Make sure patient has all necessary personal care items   Escort with two staff members if patient must leave the unit   Reduce environmental triggers     Problem: Anxiety  Goal: Will report anxiety at manageable levels  Description: INTERVENTIONS:  1. Administer medication as ordered  2. Teach and rehearse alternative coping skills  3. Provide emotional support with 1:1 interaction with staff  1/27/2025 2001 by Barbara Prabhakar RN  Outcome: Progressing  1/27/2025 1000 by Angelica Concepcion RN  Outcome: Progressing  Flowsheets (Taken 1/27/2025 0958)  Will report anxiety at manageable levels:   Administer medication as ordered   Teach and rehearse alternative coping skills   Provide emotional support with 1:1 interaction with staff     Problem: Coping  Goal: Pt/Family able to verbalize concerns and demonstrate effective coping strategies  Description: INTERVENTIONS:  1. Assist patient/family to identify coping skills, available support systems and cultural and spiritual values  2. Provide emotional support, including active listening and acknowledgement of concerns of patient and caregivers  3. Reduce environmental stimuli, as able  4. Instruct patient/family in relaxation techniques, as appropriate  5. Assess for spiritual pain/suffering and initiate Spiritual Care, Psychosocial Clinical Specialist consults as needed  1/27/2025 2001 by Barbara Prabhakar RN  Outcome: Progressing  1/27/2025 1000 by Angelica Concepcion RN  Outcome: Progressing  Flowsheets (Taken 1/27/2025 0958)  Patient/family able to verbalize anxieties, fears, and concerns, and demonstrate effective coping:   Reduce environmental stimuli, as able   Provide emotional

## 2025-01-29 VITALS
DIASTOLIC BLOOD PRESSURE: 83 MMHG | HEART RATE: 79 BPM | OXYGEN SATURATION: 99 % | WEIGHT: 140 LBS | HEIGHT: 69 IN | BODY MASS INDEX: 20.73 KG/M2 | RESPIRATION RATE: 16 BRPM | SYSTOLIC BLOOD PRESSURE: 114 MMHG | TEMPERATURE: 97.5 F

## 2025-01-29 PROCEDURE — 1240000000 HC EMOTIONAL WELLNESS R&B

## 2025-01-29 PROCEDURE — 99232 SBSQ HOSP IP/OBS MODERATE 35: CPT | Performed by: PSYCHIATRY & NEUROLOGY

## 2025-01-29 PROCEDURE — 90833 PSYTX W PT W E/M 30 MIN: CPT | Performed by: PSYCHIATRY & NEUROLOGY

## 2025-01-29 PROCEDURE — 6370000000 HC RX 637 (ALT 250 FOR IP): Performed by: NURSE PRACTITIONER

## 2025-01-29 PROCEDURE — 6370000000 HC RX 637 (ALT 250 FOR IP): Performed by: PSYCHIATRY & NEUROLOGY

## 2025-01-29 RX ADMIN — ZOLPIDEM TARTRATE 5 MG: 5 TABLET, FILM COATED ORAL at 21:20

## 2025-01-29 RX ADMIN — ARIPIPRAZOLE 5 MG: 5 TABLET ORAL at 08:57

## 2025-01-29 RX ADMIN — OXCARBAZEPINE 300 MG: 300 TABLET, FILM COATED ORAL at 21:20

## 2025-01-29 RX ADMIN — OXCARBAZEPINE 300 MG: 300 TABLET, FILM COATED ORAL at 08:57

## 2025-01-29 NOTE — GROUP NOTE
Patient did not attend group.    Date: 1/29/2025    Group Start Time: 1320  Group End Time: 1405  Group Topic: Music Therapy    [facility] 3W Ana Jaime    \"I Can See Clearly Now\" Allie Substitution  Active Music-Making, Composition, Improvisation, Live Music Listening, Songwriting     Patients will listen to/engage with live music (\"I Can See Clearly Now\" by Vega Rodriguez) through active listening, moving, and/or singing. Patients will collaborate as a group to \"rewrite\" the song so it is more personalized to their experiences. Patients/LPMT will perform their allie substitution and reflect on the experience as a group.     Focus: Building Positive Experiences, Challenging Negative Self-Talk, Creativity    Goals: Improve/Maintain Attention to Task, Improve/Maintain Insight / Self-Awareness, Increase/Maintain Level of Socialization, Improve Mood, Improve/Maintain Self-Esteem, Improve/Maintain Self-Expression, and Improve/Maintain Use of Coping Skills     Signature: Ana Diaz, Licensed Professional Music Therapist (LPMT)

## 2025-01-29 NOTE — PLAN OF CARE
Problem: Risk for Elopement  Goal: Patient will not exit the unit/facility without proper excort  Outcome: Progressing  Flowsheets (Taken 1/29/2025 1041)  Nursing Interventions for Elopement Risk: Reduce environmental triggers     Problem: Anxiety  Goal: Will report anxiety at manageable levels  Description: INTERVENTIONS:  1. Administer medication as ordered  2. Teach and rehearse alternative coping skills  3. Provide emotional support with 1:1 interaction with staff  Outcome: Progressing  Flowsheets (Taken 1/28/2025 1334 by Jeannette Cleaning, RN)  Will report anxiety at manageable levels:   Administer medication as ordered   Teach and rehearse alternative coping skills   Provide emotional support with 1:1 interaction with staff     Problem: Coping  Goal: Pt/Family able to verbalize concerns and demonstrate effective coping strategies  Description: INTERVENTIONS:  1. Assist patient/family to identify coping skills, available support systems and cultural and spiritual values  2. Provide emotional support, including active listening and acknowledgement of concerns of patient and caregivers  3. Reduce environmental stimuli, as able  4. Instruct patient/family in relaxation techniques, as appropriate  5. Assess for spiritual pain/suffering and initiate Spiritual Care, Psychosocial Clinical Specialist consults as needed  Outcome: Progressing  Flowsheets (Taken 1/28/2025 1334 by Jeannette Cleaning, RN)  Patient/family able to verbalize anxieties, fears, and concerns, and demonstrate effective coping: Provide emotional support, including active listening and acknowledgement of concerns of patient and caregivers     Problem: Confusion  Goal: Confusion, delirium, dementia, or psychosis is improved or at baseline  Description: INTERVENTIONS:  1. Assess for possible contributors to thought disturbance, including medications, impaired vision or hearing, underlying metabolic abnormalities, dehydration, psychiatric diagnoses, and

## 2025-01-29 NOTE — PLAN OF CARE
Problem: Risk for Elopement  Goal: Patient will not exit the unit/facility without proper excort  1/28/2025 2014 by Barbara Prabhakar RN  Outcome: Progressing  Flowsheets (Taken 1/28/2025 1334 by Jeannette Cleaning, RN)  Nursing Interventions for Elopement Risk: Reduce environmental triggers  1/28/2025 1255 by Jeannette Cleaning RN  Outcome: Progressing     Problem: Anxiety  Goal: Will report anxiety at manageable levels  Description: INTERVENTIONS:  1. Administer medication as ordered  2. Teach and rehearse alternative coping skills  3. Provide emotional support with 1:1 interaction with staff  1/28/2025 2014 by Barbara Prabhakar RN  Outcome: Progressing  Flowsheets (Taken 1/28/2025 1334 by Jeannette Cleaning, ALAN)  Will report anxiety at manageable levels:   Administer medication as ordered   Teach and rehearse alternative coping skills   Provide emotional support with 1:1 interaction with staff  1/28/2025 1255 by Jeannette Cleaning RN  Outcome: Progressing     Problem: Coping  Goal: Pt/Family able to verbalize concerns and demonstrate effective coping strategies  Description: INTERVENTIONS:  1. Assist patient/family to identify coping skills, available support systems and cultural and spiritual values  2. Provide emotional support, including active listening and acknowledgement of concerns of patient and caregivers  3. Reduce environmental stimuli, as able  4. Instruct patient/family in relaxation techniques, as appropriate  5. Assess for spiritual pain/suffering and initiate Spiritual Care, Psychosocial Clinical Specialist consults as needed  1/28/2025 2014 by Barbara Prabhakar RN  Outcome: Progressing  Flowsheets (Taken 1/28/2025 1334 by Jeannette Cleaning, RN)  Patient/family able to verbalize anxieties, fears, and concerns, and demonstrate effective coping: Provide emotional support, including active listening and acknowledgement of concerns of patient and caregivers  1/28/2025 1255 by Jeannette Cleaning RN  Outcome: Progressing

## 2025-01-29 NOTE — GROUP NOTE
Patient did not attend group.    Date: 1/29/2025    Group Start Time: 0915  Group End Time: 0950  Group Topic: Community Meeting    [facility] [place]    Identity Development & Personal Strength Identification through Music  Allie Analysis/Song Discussion, Receptive Music Listening    Patients will listen to \"Strength, Courage, and [place]\" by Hilary Chau and identify themes/lyrics/interpretations derived from each song. Patients will reflect on how their interpretations may relate to their personal lives.     Goals: Improve Mood, Improve Insight/Self-Awareness, Increase Socialization/Community Building, Improve Self-Expression, Improve Attention to Task, Decrease Negative Thinking, Promote Identity Development      Signature: Ana Diaz, Licensed Professional Music Therapist (LPMT)

## 2025-01-29 NOTE — GROUP NOTE
Group Therapy Note    Date: 1/29/2025    Group Start Time: 1010  Group End Time: 1055  Group Topic: Art Therapy     ML 3W Emily Walker, PAULINAW        Group Therapy Note    Attendees: 6       Patient's Goal:  To participate in morning group art therapy.    Notes:  Patient attended the morning group art therapy session. He engaged in group art therapy by using a prompt to spur on his visual expression. Patient rendered a stylistic landscape drawing and then went on to draw from his imagination. He seemed to enjoy the session.     Status After Intervention:  Improved    Participation Level: Active Listener    Participation Quality: Appropriate      Speech:  normal      Thought Process/Content: Logical      Affective Functioning: Congruent      Mood:  Cakm      Level of consciousness:  Alert      Response to Learning: Able to verbalize current knowledge/experience      Endings: None Reported    Modes of Intervention: Activity      Discipline Responsible: Psychoeducational Specialist      Signature:  Emily Kramer MA ATR LPAT

## 2025-01-29 NOTE — GROUP NOTE
Group Therapy Note    Date: 1/28/2025    Group Start Time: 1400  Group End Time: 1445  Group Topic:  Group    [facility]    Yosef Haile LSW        Group Therapy Note    Attendees: 3       Patient's Goal:  LGR Group with Karitasha Aranda     Notes:  Client participated in group process with Karinallely Aranda of [facility]. Client engaged in group process of \"Highs and lows\" in recovery efforts, communication, anger and frustration management and peer feedback.     Status After Intervention:  Improved    Participation Level: Active Listener    Participation Quality: Appropriate      Speech:  normal      Thought Process/Content: Logical      Affective Functioning: Congruent      Mood: euthymic      Level of consciousness:  Alert      Response to Learning: Able to verbalize current knowledge/experience      Endings: None Reported    Modes of Intervention: Education      Discipline Responsible: /Counselor      Signature:  ABAD Butler

## 2025-01-30 PROCEDURE — 6370000000 HC RX 637 (ALT 250 FOR IP): Performed by: PSYCHIATRY & NEUROLOGY

## 2025-01-30 PROCEDURE — 6370000000 HC RX 637 (ALT 250 FOR IP): Performed by: NURSE PRACTITIONER

## 2025-01-30 RX ORDER — ARIPIPRAZOLE 5 MG/1
5 TABLET ORAL DAILY
Qty: 15 TABLET | Refills: 3 | Status: SHIPPED | OUTPATIENT
Start: 2025-01-31

## 2025-01-30 RX ORDER — ZOLPIDEM TARTRATE 5 MG/1
5 TABLET ORAL NIGHTLY
Qty: 14 TABLET | Refills: 0 | Status: SHIPPED | OUTPATIENT
Start: 2025-01-30 | End: 2025-02-13

## 2025-01-30 RX ORDER — OXCARBAZEPINE 300 MG/1
300 TABLET, FILM COATED ORAL 2 TIMES DAILY
Qty: 30 TABLET | Refills: 3 | Status: SHIPPED | OUTPATIENT
Start: 2025-01-30

## 2025-01-30 RX ADMIN — ARIPIPRAZOLE 5 MG: 5 TABLET ORAL at 09:04

## 2025-01-30 RX ADMIN — OXCARBAZEPINE 300 MG: 300 TABLET, FILM COATED ORAL at 09:04

## 2025-01-30 NOTE — DISCHARGE INSTR - DIET
Good nutrition is important when healing from an illness, injury, or surgery.  Follow any nutrition recommendations given to you during your hospital stay.   If you were given an oral nutrition supplement while in the hospital, continue to take this supplement at home.  You can take it with meals, in-between meals, and/or before bedtime. These supplements can be purchased at most local grocery stores, pharmacies, and chain [employer]-stores.   If you have any questions about your diet or nutrition, call the hospital and ask for the dietitian.  Resume diet as tolerated.

## 2025-01-30 NOTE — PLAN OF CARE
Problem: Risk for Elopement  Goal: Patient will not exit the unit/facility without proper excort  1/29/2025 1957 by Barbara Prabhakar RN  Outcome: Progressing  1/29/2025 1042 by Kari Robert RN  Outcome: Progressing  Flowsheets (Taken 1/29/2025 1041)  Nursing Interventions for Elopement Risk: Reduce environmental triggers     Problem: Anxiety  Goal: Will report anxiety at manageable levels  Description: INTERVENTIONS:  1. Administer medication as ordered  2. Teach and rehearse alternative coping skills  3. Provide emotional support with 1:1 interaction with staff  1/29/2025 1957 by Barbara Prabhakar RN  Outcome: Progressing  1/29/2025 1042 by Kari Robert RN  Outcome: Progressing  Flowsheets (Taken 1/28/2025 1334 by Jeannette Cleaning, RN)  Will report anxiety at manageable levels:   Administer medication as ordered   Teach and rehearse alternative coping skills   Provide emotional support with 1:1 interaction with staff     Problem: Coping  Goal: Pt/Family able to verbalize concerns and demonstrate effective coping strategies  Description: INTERVENTIONS:  1. Assist patient/family to identify coping skills, available support systems and cultural and spiritual values  2. Provide emotional support, including active listening and acknowledgement of concerns of patient and caregivers  3. Reduce environmental stimuli, as able  4. Instruct patient/family in relaxation techniques, as appropriate  5. Assess for spiritual pain/suffering and initiate Spiritual Care, Psychosocial Clinical Specialist consults as needed  1/29/2025 1957 by Barbara Prabhakar RN  Outcome: Progressing  1/29/2025 1042 by Kari Robert RN  Outcome: Progressing  Flowsheets (Taken 1/28/2025 1334 by Jeannette Cleaning, RN)  Patient/family able to verbalize anxieties, fears, and concerns, and demonstrate effective coping: Provide emotional support, including active listening and acknowledgement of concerns of patient and caregivers
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
## 2025-01-30 NOTE — GROUP NOTE
Patient did not attend group / Anticipated discharge today.    Date: 1/30/2025    Group Start Time: 1020  Group End Time: 1055  Group Topic: Music Therapy    [facility] [place] [facility] Ana Jaime Collage  Receptive Music Listening, Allie Analysis/Song Discussion, and Art Reflection    Patients will choose from a variety of pre-selected song lyrics to arrange into a collage representing where they are at today / who they are. Patients will be encouraged to add color and additional words to their collage if they so choose. Patients will listen to an LPMT-curated playlist comprised of the songs where the pre-selected lyrics originate.    Focus: Creativity, Identity Development, Self-Esteem Building    Goals: Improve/Maintain Attention to Task, Improve/Maintain Identity Development, Improve/Maintain Insight / Self-Awareness, Increase/Maintain Level of Socialization, Improve Mood, Improve/Maintain Self-Esteem, Improve/Maintain Self-Expression, Improve/Maintain Use of Coping Skills, and Increase Sensory Stimulation     Signature: Ana Diaz, Licensed Professional Music Therapist (LPMT)

## 2025-01-30 NOTE — TRANSITION OF CARE
Behavioral Health Transition Record    Patient Name: Joaquin Dugan  YOB: 1995   Medical Record Number: [id number]  Date of Admission: 1/24/2025  5:40 PM   Date of Discharge: 1/30/25     Attending Provider: Dusty Hernandez MD   Discharging Provider: Dusty Hernandez MD   To contact this individual call [facility] unit at [phone] or call [facility] at [phone] and ask the  to page.  If unavailable, ask to be transferred to Behavioral Health Provider on call.  A Behavioral Health Provider will be available on call 24/7 and during holidays.    Primary Care Provider: No primary care provider on file.    No Known Allergies    Reason for Admission:   Pt has not been taking his medications for the past 6 to 7 months and has been having conflict with his mother and step dad with who he resides.  Pt is also hearing voices that are telling him to do things and pt is seeing shadow people and other visual hallucinations of figures that are frightening him. Pt had a dream last night that he was going to have a disagreement with someone and that someone would bust in his bedroom door and shoot him today and so he decided that he needed to either get help or suicide.  When this nurse asked pt about a plan or intent, pt responded, \"My mind is like the movie \"A 1000 Ways to Die. I can do many things to kill myself.\"    Admission Diagnosis: Schizoaffective disorder, depressive type (HCC) [F25.1]    * No surgery found *    Results for orders placed or performed during the hospital encounter of 01/24/25   Comprehensive Metabolic Panel   Result Value Ref Range    Sodium 141 135 - 144 mEq/L    Potassium 4.4 3.4 - 4.9 mEq/L    Chloride 104 95 - 107 mEq/L    CO2 25 20 - 31 mEq/L    Anion Gap 12 9 - 15 mEq/L    Glucose 107 (H) 70 - 99 mg/dL    BUN 11 6 - 20 mg/dL    Creatinine 0.72 0.70 - 1.20 mg/dL    Est, Glom Filt Rate >90.0 >60    Calcium 9.2 8.5 - 9.9 mg/dL    Total Protein 7.1 6.3 -

## 2025-01-30 NOTE — GROUP NOTE
Patient did not attend group / Anticipated discharge today.    Date: 1/30/2025    Group Start Time: 0905  Group End Time: 0950  Group Topic: Community Meeting    [facility] 3W Ana Jaime    Personal Strengths, Overcoming Challenges, and Coping  Allie Analysis/Song Discussion, Receptive Music Listening, Exploration, Clarification    Patients will listen to \"Hero\" by Janet Martin and identify themes/lyrics/interpretations derived from each song. Patients will discuss qualities that define what a \"hero\" is. Patients will explore their personal strengths, challenges they've overcome, and how they got through difficult times.     Goals: Improve/Maintain Attention to Task, Improve/Maintain Identity Development, Improve/Maintain Insight / Self-Awareness, Increase/Maintain Level of Socialization, Improve Mood, Improve/Maintain Self-Esteem, Improve/Maintain Self-Expression, Improve/Maintain Use of Coping Skills, and Promote Reality Orientation      Signature: Ana Diaz, Licensed Professional Music Therapist (LPMT)

## 2025-01-30 NOTE — PLAN OF CARE
Problem: Risk for Elopement  Goal: Patient will not exit the unit/facility without proper excort  1/30/2025 0724 by Monet Wells RN  Outcome: Progressing  1/29/2025 1957 by Barbara Prabhakar RN  Outcome: Progressing     Problem: Anxiety  Goal: Will report anxiety at manageable levels  Description: INTERVENTIONS:  1. Administer medication as ordered  2. Teach and rehearse alternative coping skills  3. Provide emotional support with 1:1 interaction with staff  1/30/2025 0724 by Monet Wells RN  Outcome: Progressing  1/29/2025 1957 by Barbara Prabhakar RN  Outcome: Progressing     Problem: Coping  Goal: Pt/Family able to verbalize concerns and demonstrate effective coping strategies  Description: INTERVENTIONS:  1. Assist patient/family to identify coping skills, available support systems and cultural and spiritual values  2. Provide emotional support, including active listening and acknowledgement of concerns of patient and caregivers  3. Reduce environmental stimuli, as able  4. Instruct patient/family in relaxation techniques, as appropriate  5. Assess for spiritual pain/suffering and initiate Spiritual Care, Psychosocial Clinical Specialist consults as needed  1/30/2025 0724 by Monet Wells RN  Outcome: Progressing  1/29/2025 1957 by Barbara Prabhakar RN  Outcome: Progressing     Problem: Confusion  Goal: Confusion, delirium, dementia, or psychosis is improved or at baseline  Description: INTERVENTIONS:  1. Assess for possible contributors to thought disturbance, including medications, impaired vision or hearing, underlying metabolic abnormalities, dehydration, psychiatric diagnoses, and notify attending LIP  2. [place] high risk fall precautions, as indicated  3. Provide frequent short contacts to provide reality reorientation, refocusing and direction  4. Decrease environmental stimuli, including noise as appropriate  5. Monitor and intervene to maintain adequate nutrition, hydration, elimination, sleep

## 2025-01-30 NOTE — DISCHARGE INSTRUCTIONS
Flu vaccine was offered and pt refused 1/30/25.    Due to the Covid-19 Pandemic, [facility] is not currently available. For assistance with quitting smoking please go to https://smokefree.gov. A prescription for an FDA-approved tobacco cessation medication was offered at discharge and the patient refused.    Someone from [facility] will be calling you tomorrow to follow up on your care. If you don't hear from us, give us a call! [phone].    Keep all follow up appointments, take medications as ordered, utilize positive supports, abstain from use of alcohol and drugs. If symptoms return or you feel at risk to yourself or others, please call 911, return the nearest emergency room, or call your local crisis hotline:  [facility]: [phone]  [facility]: [phone]  [facility]: [phone]

## 2025-01-30 NOTE — DISCHARGE SUMMARY
DISCHARGE SUMMARY      Patient ID:  Joaquin Dugan  [id number]  29 y.o.  1995      Admit date: 1/24/2025    Discharge date and time: 1/30/2025    Admitting Physician: Fanta Washington MD     Discharge Physician: Dr David CEE    Admission Diagnoses: Schizoaffective disorder, depressive type (HCC) [F25.1]    Admission Condition: poor    Discharged Condition: stable    Admission Circumstance:   Joaquin Dugan is a 29-year-old male with a psychiatric history of schizoaffective disorder known to these providers with past inpatient psychiatric hospitalization, presenting to the emergency room with suicidal ideation and depression, placed on involuntary by the [facility] for suicidal ideation and auditory hallucinations.  Duration of acute symptoms is unclear, precipitating events include noncompliance with medications and treatment and not taking medications for 6 to 8 months.     HISTORY OF PRESENT ILLNESS:       The patient is a 29 y.o. male with significant past history of schizoaffective disorder, known to these providers with past inpatient psychiatric hospitalization presenting to the emergency room reporting suicidal ideation and depression was placed on involuntary hold by the [facility] due to thoughts of suicide, and auditory hallucinations.  Medically cleared in the emergency room, UDS in ED is negative.  Admitted to inpatient behavioral health.     On evaluation today the patient is resting in his bed, he states that he just needs to be placed back on his medications.  He states he has not been on them for some time and tells me that the [facility] was refusing to refill his prescriptions.  He states that he did not feel he was doing well with depakote and does not see how that medication was helping him, he is agreeable to start Trileptal for his mood.  He is able to tell me he has a diagnosis of schizoaffective disorder bipolar type and is able to recognize that he needs to stay on medications.  He

## 2025-05-01 ENCOUNTER — HOSPITAL ENCOUNTER (EMERGENCY)
Facility: HOSPITAL | Age: 30
Discharge: HOME | End: 2025-05-01
Payer: MEDICAID

## 2025-05-01 ENCOUNTER — APPOINTMENT (OUTPATIENT)
Dept: CARDIOLOGY | Facility: HOSPITAL | Age: 30
End: 2025-05-01
Payer: MEDICAID

## 2025-05-01 ENCOUNTER — APPOINTMENT (OUTPATIENT)
Dept: RADIOLOGY | Facility: HOSPITAL | Age: 30
End: 2025-05-01
Payer: MEDICAID

## 2025-05-01 VITALS
HEIGHT: 68 IN | BODY MASS INDEX: 22.73 KG/M2 | SYSTOLIC BLOOD PRESSURE: 113 MMHG | DIASTOLIC BLOOD PRESSURE: 68 MMHG | RESPIRATION RATE: 18 BRPM | HEART RATE: 67 BPM | TEMPERATURE: 98.2 F | OXYGEN SATURATION: 98 % | WEIGHT: 150 LBS

## 2025-05-01 DIAGNOSIS — K92.0 HEMATEMESIS WITH NAUSEA: ICD-10-CM

## 2025-05-01 DIAGNOSIS — R11.2 NAUSEA AND VOMITING, UNSPECIFIED VOMITING TYPE: Primary | ICD-10-CM

## 2025-05-01 LAB
ABO GROUP (TYPE) IN BLOOD: NORMAL
ALBUMIN SERPL BCP-MCNC: 4.5 G/DL (ref 3.4–5)
ALP SERPL-CCNC: 50 U/L (ref 33–120)
ALT SERPL W P-5'-P-CCNC: 13 U/L (ref 10–52)
ANION GAP SERPL CALC-SCNC: 12 MMOL/L (ref 10–20)
ANTIBODY SCREEN: NORMAL
APTT PPP: 30 SECONDS (ref 26–36)
AST SERPL W P-5'-P-CCNC: 13 U/L (ref 9–39)
BASOPHILS # BLD AUTO: 0.01 X10*3/UL (ref 0–0.1)
BASOPHILS NFR BLD AUTO: 0.2 %
BILIRUB SERPL-MCNC: 1 MG/DL (ref 0–1.2)
BUN SERPL-MCNC: 17 MG/DL (ref 6–23)
CALCIUM SERPL-MCNC: 9.1 MG/DL (ref 8.6–10.3)
CHLORIDE SERPL-SCNC: 103 MMOL/L (ref 98–107)
CO2 SERPL-SCNC: 29 MMOL/L (ref 21–32)
CREAT SERPL-MCNC: 0.89 MG/DL (ref 0.5–1.3)
EGFRCR SERPLBLD CKD-EPI 2021: >90 ML/MIN/1.73M*2
EOSINOPHIL # BLD AUTO: 0.04 X10*3/UL (ref 0–0.7)
EOSINOPHIL NFR BLD AUTO: 0.9 %
ERYTHROCYTE [DISTWIDTH] IN BLOOD BY AUTOMATED COUNT: 12.7 % (ref 11.5–14.5)
GLUCOSE SERPL-MCNC: 86 MG/DL (ref 74–99)
HCT VFR BLD AUTO: 41.7 % (ref 41–52)
HGB BLD-MCNC: 14.4 G/DL (ref 13.5–17.5)
IMM GRANULOCYTES # BLD AUTO: 0.01 X10*3/UL (ref 0–0.7)
IMM GRANULOCYTES NFR BLD AUTO: 0.2 % (ref 0–0.9)
INR PPP: 1 (ref 0.9–1.1)
LACTATE SERPL-SCNC: 0.6 MMOL/L (ref 0.4–2)
LIPASE SERPL-CCNC: 13 U/L (ref 9–82)
LYMPHOCYTES # BLD AUTO: 1.37 X10*3/UL (ref 1.2–4.8)
LYMPHOCYTES NFR BLD AUTO: 31.4 %
MCH RBC QN AUTO: 32.2 PG (ref 26–34)
MCHC RBC AUTO-ENTMCNC: 34.5 G/DL (ref 32–36)
MCV RBC AUTO: 93 FL (ref 80–100)
MONOCYTES # BLD AUTO: 0.35 X10*3/UL (ref 0.1–1)
MONOCYTES NFR BLD AUTO: 8 %
NEUTROPHILS # BLD AUTO: 2.59 X10*3/UL (ref 1.2–7.7)
NEUTROPHILS NFR BLD AUTO: 59.3 %
NRBC BLD-RTO: 0 /100 WBCS (ref 0–0)
PLATELET # BLD AUTO: 303 X10*3/UL (ref 150–450)
POTASSIUM SERPL-SCNC: 4 MMOL/L (ref 3.5–5.3)
PROT SERPL-MCNC: 7 G/DL (ref 6.4–8.2)
PROTHROMBIN TIME: 11.5 SECONDS (ref 9.8–12.4)
RBC # BLD AUTO: 4.47 X10*6/UL (ref 4.5–5.9)
RH FACTOR (ANTIGEN D): NORMAL
SODIUM SERPL-SCNC: 140 MMOL/L (ref 136–145)
WBC # BLD AUTO: 4.4 X10*3/UL (ref 4.4–11.3)

## 2025-05-01 PROCEDURE — 96374 THER/PROPH/DIAG INJ IV PUSH: CPT | Mod: 59

## 2025-05-01 PROCEDURE — 86901 BLOOD TYPING SEROLOGIC RH(D): CPT

## 2025-05-01 PROCEDURE — 80053 COMPREHEN METABOLIC PANEL: CPT

## 2025-05-01 PROCEDURE — 74177 CT ABD & PELVIS W/CONTRAST: CPT

## 2025-05-01 PROCEDURE — 85025 COMPLETE CBC W/AUTO DIFF WBC: CPT

## 2025-05-01 PROCEDURE — 96361 HYDRATE IV INFUSION ADD-ON: CPT

## 2025-05-01 PROCEDURE — 83690 ASSAY OF LIPASE: CPT

## 2025-05-01 PROCEDURE — 74177 CT ABD & PELVIS W/CONTRAST: CPT | Performed by: RADIOLOGY

## 2025-05-01 PROCEDURE — 2500000004 HC RX 250 GENERAL PHARMACY W/ HCPCS (ALT 636 FOR OP/ED): Mod: JZ

## 2025-05-01 PROCEDURE — 93005 ELECTROCARDIOGRAM TRACING: CPT

## 2025-05-01 PROCEDURE — 99285 EMERGENCY DEPT VISIT HI MDM: CPT | Mod: 25

## 2025-05-01 PROCEDURE — 85610 PROTHROMBIN TIME: CPT

## 2025-05-01 PROCEDURE — 83605 ASSAY OF LACTIC ACID: CPT

## 2025-05-01 PROCEDURE — 36415 COLL VENOUS BLD VENIPUNCTURE: CPT

## 2025-05-01 PROCEDURE — 2550000001 HC RX 255 CONTRASTS

## 2025-05-01 RX ORDER — FAMOTIDINE 20 MG/1
20 TABLET, FILM COATED ORAL 2 TIMES DAILY
Qty: 30 TABLET | Refills: 0 | Status: SHIPPED | OUTPATIENT
Start: 2025-05-01 | End: 2025-05-16

## 2025-05-01 RX ORDER — ONDANSETRON HYDROCHLORIDE 2 MG/ML
4 INJECTION, SOLUTION INTRAVENOUS ONCE
Status: COMPLETED | OUTPATIENT
Start: 2025-05-01 | End: 2025-05-01

## 2025-05-01 RX ORDER — ONDANSETRON 4 MG/1
4 TABLET, ORALLY DISINTEGRATING ORAL EVERY 8 HOURS PRN
Qty: 20 TABLET | Refills: 0 | Status: SHIPPED | OUTPATIENT
Start: 2025-05-01 | End: 2025-05-08

## 2025-05-01 RX ADMIN — ONDANSETRON 4 MG: 2 INJECTION INTRAMUSCULAR; INTRAVENOUS at 12:13

## 2025-05-01 RX ADMIN — SODIUM CHLORIDE, SODIUM LACTATE, POTASSIUM CHLORIDE, AND CALCIUM CHLORIDE 1000 ML: .6; .31; .03; .02 INJECTION, SOLUTION INTRAVENOUS at 12:14

## 2025-05-01 RX ADMIN — IOHEXOL 75 ML: 350 INJECTION, SOLUTION INTRAVENOUS at 12:55

## 2025-05-01 ASSESSMENT — LIFESTYLE VARIABLES
HAVE PEOPLE ANNOYED YOU BY CRITICIZING YOUR DRINKING: NO
EVER FELT BAD OR GUILTY ABOUT YOUR DRINKING: NO
EVER HAD A DRINK FIRST THING IN THE MORNING TO STEADY YOUR NERVES TO GET RID OF A HANGOVER: NO
HAVE YOU EVER FELT YOU SHOULD CUT DOWN ON YOUR DRINKING: NO
TOTAL SCORE: 0

## 2025-05-01 ASSESSMENT — COLUMBIA-SUICIDE SEVERITY RATING SCALE - C-SSRS
1. IN THE PAST MONTH, HAVE YOU WISHED YOU WERE DEAD OR WISHED YOU COULD GO TO SLEEP AND NOT WAKE UP?: NO
6. HAVE YOU EVER DONE ANYTHING, STARTED TO DO ANYTHING, OR PREPARED TO DO ANYTHING TO END YOUR LIFE?: NO
2. HAVE YOU ACTUALLY HAD ANY THOUGHTS OF KILLING YOURSELF?: NO

## 2025-05-01 ASSESSMENT — PAIN SCALES - GENERAL: PAINLEVEL_OUTOF10: 0 - NO PAIN

## 2025-05-01 ASSESSMENT — PAIN - FUNCTIONAL ASSESSMENT: PAIN_FUNCTIONAL_ASSESSMENT: 0-10

## 2025-05-01 NOTE — Clinical Note
Junito Horvath was seen and treated in our emergency department on 5/1/2025.  He may return to work on 05/02/2025.       If you have any questions or concerns, please don't hesitate to call.      Semaj Olivas PA-C

## 2025-05-01 NOTE — ED PROVIDER NOTES
HPI   Chief Complaint   Patient presents with    Flu Symptoms     Nausea and vomiting starting last night. Pt went to urgent care and they advised him to come here.       History provided by: Patient    Limitations to history: None    CC: Hematemesis    HPI: 30-year-old male with a history of schizophrenia presents the emergency department to be evaluated for hematemesis.  Patient states that he started not feeling well yesterday with nausea and several episodes of vomiting.  He states that originally he was vomiting stomach contents but then he started noticing small streaks of blood.  He has vomited blood in the past but it has been several years.  He was not medically evaluated for that.  His episode.  He denies use of anticoagulants or antiplatelets.  He denies history of cirrhosis, esophageal varices, alcohol abuse.  He has never required an endoscope or colonoscopy.  He reports that he does intermittently feel lightheaded when he stands up too quickly but denies dizziness.  Denies chest pain or shortness of breath.  Patient is no longer feeling lightheaded after he drank some fluids.  He states that he is still feeling some slight nausea but is not having any abdominal pain or flank pain.  When he does have the discomfort, the abdominal pain is generalized.  Denies urgency, frequency, dysuria.  Denies blood in the urine or stool.  Denies fever chills or bodyaches.  Denies all other systemic symptoms.    ROS: Negative unless mentioned in HPI    Medical Hx: Allergies reviewed.  Immunizations are up-to-date.    Physical exam:    Constitutional: Sitting comfortably in the room and in no distress.  Oriented to person, place, time, and situation.    HEENT: Head is normocephalic, atraumatic. Patient's airway is patent.  Tympanic membranes are clear bilaterally.  Nasal mucosa clear.  Mouth with normal mucosa.  Throat is not erythematous and there are no oropharyngeal exudates, uvula is midline.  No obvious facial  deformities.    Eyes: Clear bilaterally.  Pupils are equal round and reactive to light and accommodation.  Extraocular movements intact.      Cardiac: Regular rate, regular rhythm.  Heart sounds S1, S2.  No murmurs, rubs, or gallops.  PMI nondisplaced.  No JVD.    Respiratory: Regular respiratory rate and effort.  Breath sounds are clear and equal bilaterally, no adventitious lung sounds.  Patient is speaking in full sentences and is in no apparent respiratory distress. No use of accessory muscles.      Gastrointestinal: Abdomen is soft, nondistended, and nontender.  There are no obvious deformities.  No rebound tenderness or guarding.  Bowel sounds are normal active.    Genitourinary: No CVA or flank tenderness.    Musculoskeletal: No reproducible tenderness.  No obvious skin or bony deformities.  Patient has equal range of motion in all extremities and no strength deficiencies.  No muscle or joint tenderness. No back or neck tenderness.  Capillary refill less than 3 seconds.  Strong peripheral pulses.  No sensory deficits.    Neurological: Patient is alert and oriented.  No focal deficits.  5/5 strength in all extremities.  Cranial nerves II through XII intact. GCS15.     Skin: Skin is normal color for race and is warm, dry, and intact.  No evidence of trauma.  No lesions, rashes, bruising, jaundice, or masses.    Psych: Appropriate mood and affect.  No apparent risk to self or others.    Heme/lymph: No adenopathy, lymphadenopathy, or splenomegaly    Physical exam is otherwise negative unless stated above or in history of present illness.              Patient History   Medical History[1]  Surgical History[2]  Family History[3]  Social History[4]    Physical Exam   ED Triage Vitals [05/01/25 1057]   Temperature Heart Rate Respirations BP   36.8 °C (98.2 °F) 76 18 115/71      Pulse Ox Temp Source Heart Rate Source Patient Position   98 % Tympanic Monitor Sitting      BP Location FiO2 (%)     Left arm --        Physical Exam      ED Course & [facility] [place]   Diagnoses as of 05/01/25 1424   Nausea and vomiting, unspecified vomiting type   Hematemesis with nausea          Patient updated on plan for lab testing, IV insertion, radiology imaging, and medications to be administered while in the ER (if indicated). Patient updated on expected wait times for testing and results. Patient provided my name and told to ask any staff member for questions or concerns if they should arise. Electronic medical record reviewed.     [facility]    Patient presented to the emergency department with the chief complaint of vomiting blood.  Examination of the patient's heart and lungs are unremarkable.  Abdomen is soft, nontender, nondistended.  On arrival to the emergency department, vital signs were within normal limits    Will give the patient IV lactated Ringer's as well as Zofran.  I will check basic blood work, EKG, lactate, lipase, CT abdomen pelvis, type and screen and coagulation screen.    EKG performed at 1217 and interpreted by me.  Normal sinus rhythm 65 beats minute.  Normal axis.  No ST elevation or depression.  No prolonged QT.    EKG was performed at 1255 and interpreted by me.  Normal sinus rhythm 75 beats minute.  Normal axis.  No ST elevation or depression.  No prolonged QT.    Coagulation screens within normal limits.  Lipase is 13.  Lactate is 0.6.  CBC reveals no leukocytosis or anemia.  Patient's hemoglobin is stable he does not require emergent endoscopic colonoscopy or blood transfusion at this time.  CMP is unremarkable.  CT reveals several incidental and chronic findings but no acute abnormality.  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  Patient feels well and feels comfortable going home.  Patient has not had any further episodes of hematemesis.  Patient will be started on Pepcid and Zofran.  He is able to tolerate p.o. intake after the p.o. Zofran.  He will follow-up with GI  as an outpatient, I will have an appointment set up for him.  Very strict return precautions initially including weakness, fatigue, worsening or persistent symptoms.  All questions and concerns addressed.  Reasons to return to ER discussed.  Patient verbalized understanding and agreement with the treatment plan and they remained hemodynamically stable in the ER.    This note was dictated using a speech recognition program.  While an attempt was made at proof-reading to minimize errors, minor errors in transcription may be present       No data recorded     Jose Angel Coma Scale Score: 15 (05/01/25 1133 : Adalberto Diaz RN)                           Medical Decision Making      Procedure  Procedures         [1] No past medical history on file.  [2] No past surgical history on file.  [3]   Family History  Problem Relation Name Age of Onset    No Known Problems Mother      No Known Problems Father     [4]   Social History  Tobacco Use    Smoking status: Not on file    Smokeless tobacco: Not on file   Substance Use Topics    Alcohol use: Not on file    Drug use: Not on file        Semaj Olivas PA-C  05/01/25 [id number]

## 2025-05-02 LAB
ATRIAL RATE: 65 BPM
P AXIS: 60 DEGREES
P OFFSET: 199 MS
P ONSET: 148 MS
PR INTERVAL: 142 MS
Q ONSET: 219 MS
QRS COUNT: 11 BEATS
QRS DURATION: 82 MS
QT INTERVAL: 368 MS
QTC CALCULATION(BAZETT): 382 MS
QTC FREDERICIA: 377 MS
R AXIS: 83 DEGREES
T AXIS: 59 DEGREES
T OFFSET: 403 MS
VENTRICULAR RATE: 65 BPM